# Patient Record
Sex: MALE | Race: WHITE | Employment: OTHER
[De-identification: names, ages, dates, MRNs, and addresses within clinical notes are randomized per-mention and may not be internally consistent; named-entity substitution may affect disease eponyms.]

---

## 2017-01-25 ENCOUNTER — TELEPHONE (OUTPATIENT)
Dept: CASE MANAGEMENT | Age: 82
End: 2017-01-25

## 2018-02-13 PROBLEM — H93.13 TINNITUS OF BOTH EARS: Status: ACTIVE | Noted: 2018-02-13

## 2018-03-12 ENCOUNTER — TELEPHONE (OUTPATIENT)
Dept: PULMONOLOGY | Age: 83
End: 2018-03-12

## 2018-04-16 ENCOUNTER — TELEPHONE (OUTPATIENT)
Dept: PULMONOLOGY | Age: 83
End: 2018-04-16

## 2018-04-30 ENCOUNTER — TELEPHONE (OUTPATIENT)
Dept: CASE MANAGEMENT | Age: 83
End: 2018-04-30

## 2018-05-16 ENCOUNTER — OFFICE VISIT (OUTPATIENT)
Dept: PULMONOLOGY | Age: 83
End: 2018-05-16
Payer: COMMERCIAL

## 2018-05-16 DIAGNOSIS — R91.8 MULTIPLE LUNG NODULES: Primary | ICD-10-CM

## 2018-05-16 PROCEDURE — 99204 OFFICE O/P NEW MOD 45 MIN: CPT | Performed by: INTERNAL MEDICINE

## 2018-05-17 ENCOUNTER — TELEPHONE (OUTPATIENT)
Dept: CASE MANAGEMENT | Age: 83
End: 2018-05-17

## 2018-07-09 ENCOUNTER — TELEPHONE (OUTPATIENT)
Dept: PULMONOLOGY | Age: 83
End: 2018-07-09

## 2018-08-20 ENCOUNTER — TELEPHONE (OUTPATIENT)
Dept: CASE MANAGEMENT | Age: 83
End: 2018-08-20

## 2018-08-20 NOTE — TELEPHONE ENCOUNTER
Patient was a no show for his follow up CT Chest, I attempted to call and reschedule but was unable to contact him. No answer/ no voicemail. He has a follow up at the [place] [address] on 9/19/18 and was supposed to have the scan prior. Mailed letter.       MIKE Hernandez., R.T.(R)(T)  Navigator  [facility]

## 2018-09-17 ENCOUNTER — TELEPHONE (OUTPATIENT)
Dept: CASE MANAGEMENT | Age: 83
End: 2018-09-17

## 2018-10-04 ENCOUNTER — TELEPHONE (OUTPATIENT)
Dept: CASE MANAGEMENT | Age: 83
End: 2018-10-04

## 2018-11-30 ENCOUNTER — TELEPHONE (OUTPATIENT)
Dept: CASE MANAGEMENT | Age: 83
End: 2018-11-30

## 2019-01-02 ENCOUNTER — TELEPHONE (OUTPATIENT)
Dept: CASE MANAGEMENT | Age: 84
End: 2019-01-02

## 2019-04-14 ENCOUNTER — APPOINTMENT (OUTPATIENT)
Dept: ULTRASOUND IMAGING | Age: 84
End: 2019-04-14
Payer: MEDICARE

## 2019-04-14 ENCOUNTER — APPOINTMENT (OUTPATIENT)
Dept: CT IMAGING | Age: 84
End: 2019-04-14
Payer: MEDICARE

## 2019-04-14 ENCOUNTER — HOSPITAL ENCOUNTER (OUTPATIENT)
Age: 84
Setting detail: OBSERVATION
Discharge: HOME | End: 2019-04-15
Attending: EMERGENCY MEDICINE | Admitting: INTERNAL MEDICINE
Payer: MEDICARE

## 2019-04-14 ENCOUNTER — APPOINTMENT (OUTPATIENT)
Dept: GENERAL RADIOLOGY | Age: 84
End: 2019-04-14
Payer: MEDICARE

## 2019-04-14 DIAGNOSIS — M54.2 NECK PAIN: ICD-10-CM

## 2019-04-14 DIAGNOSIS — R55 SYNCOPE AND COLLAPSE: Primary | ICD-10-CM

## 2019-04-14 PROBLEM — I10 HTN (HYPERTENSION): Status: ACTIVE | Noted: 2019-04-14

## 2019-04-14 LAB
ALBUMIN SERPL-MCNC: 3.7 G/DL (ref 3.5–5.2)
ALP BLD-CCNC: 97 U/L (ref 40–129)
ALT SERPL-CCNC: 6 U/L (ref 0–40)
ANION GAP SERPL CALCULATED.3IONS-SCNC: 14 MMOL/L (ref 7–16)
AST SERPL-CCNC: 14 U/L (ref 0–39)
BILIRUB SERPL-MCNC: 0.2 MG/DL (ref 0–1.2)
BILIRUBIN URINE: NEGATIVE
BLOOD, URINE: NEGATIVE
BUN BLDV-MCNC: 11 MG/DL (ref 8–23)
CALCIUM SERPL-MCNC: 8.3 MG/DL (ref 8.6–10.2)
CHLORIDE BLD-SCNC: 97 MMOL/L (ref 98–107)
CLARITY: CLEAR
CO2: 22 MMOL/L (ref 22–29)
COLOR: ABNORMAL
CREAT SERPL-MCNC: 1.1 MG/DL (ref 0.7–1.2)
EKG ATRIAL RATE: 72 BPM
EKG P AXIS: 83 DEGREES
EKG P-R INTERVAL: 194 MS
EKG Q-T INTERVAL: 416 MS
EKG QRS DURATION: 94 MS
EKG QTC CALCULATION (BAZETT): 455 MS
EKG R AXIS: 33 DEGREES
EKG T AXIS: 113 DEGREES
EKG VENTRICULAR RATE: 72 BPM
GFR AFRICAN AMERICAN: >60
GFR NON-AFRICAN AMERICAN: >60 ML/MIN/1.73
GLUCOSE BLD-MCNC: 120 MG/DL (ref 74–99)
GLUCOSE URINE: NEGATIVE MG/DL
HCT VFR BLD CALC: 33.6 % (ref 37–54)
HEMOGLOBIN: 10.8 G/DL (ref 12.5–16.5)
INR BLD: 1.1
KETONES, URINE: ABNORMAL MG/DL
LACTIC ACID: 1.6 MMOL/L (ref 0.5–2.2)
LEUKOCYTE ESTERASE, URINE: NEGATIVE
MCH RBC QN AUTO: 27.8 PG (ref 26–35)
MCHC RBC AUTO-ENTMCNC: 32.1 % (ref 32–34.5)
MCV RBC AUTO: 86.6 FL (ref 80–99.9)
NITRITE, URINE: NEGATIVE
PDW BLD-RTO: 13.9 FL (ref 11.5–15)
PH UA: 6.5 (ref 5–9)
PHENYTOIN DOSE AMOUNT: ABNORMAL
PHENYTOIN LEVEL: 2 UG/ML (ref 10–20)
PLATELET # BLD: 203 E9/L (ref 130–450)
PMV BLD AUTO: 9.9 FL (ref 7–12)
POTASSIUM SERPL-SCNC: 3.9 MMOL/L (ref 3.5–5)
PROTEIN UA: NEGATIVE MG/DL
PROTHROMBIN TIME: 12 SEC (ref 9.3–12.4)
RBC # BLD: 3.88 E12/L (ref 3.8–5.8)
SODIUM BLD-SCNC: 133 MMOL/L (ref 132–146)
SPECIFIC GRAVITY UA: <=1.005 (ref 1–1.03)
TOTAL CK: 127 U/L (ref 20–200)
TOTAL PROTEIN: 6.7 G/DL (ref 6.4–8.3)
TROPONIN: <0.01 NG/ML (ref 0–0.03)
UROBILINOGEN, URINE: 0.2 E.U./DL
WBC # BLD: 11 E9/L (ref 4.5–11.5)

## 2019-04-14 PROCEDURE — 81003 URINALYSIS AUTO W/O SCOPE: CPT

## 2019-04-14 PROCEDURE — 80185 ASSAY OF PHENYTOIN TOTAL: CPT

## 2019-04-14 PROCEDURE — 85027 COMPLETE CBC AUTOMATED: CPT

## 2019-04-14 PROCEDURE — 93880 EXTRACRANIAL BILAT STUDY: CPT

## 2019-04-14 PROCEDURE — 82550 ASSAY OF CK (CPK): CPT

## 2019-04-14 PROCEDURE — 74177 CT ABD & PELVIS W/CONTRAST: CPT

## 2019-04-14 PROCEDURE — 80053 COMPREHEN METABOLIC PANEL: CPT

## 2019-04-14 PROCEDURE — 85610 PROTHROMBIN TIME: CPT

## 2019-04-14 PROCEDURE — 71045 X-RAY EXAM CHEST 1 VIEW: CPT

## 2019-04-14 PROCEDURE — 93005 ELECTROCARDIOGRAM TRACING: CPT | Performed by: EMERGENCY MEDICINE

## 2019-04-14 PROCEDURE — G0378 HOSPITAL OBSERVATION PER HR: HCPCS

## 2019-04-14 PROCEDURE — 96361 HYDRATE IV INFUSION ADD-ON: CPT

## 2019-04-14 PROCEDURE — 99285 EMERGENCY DEPT VISIT HI MDM: CPT

## 2019-04-14 PROCEDURE — 97161 PT EVAL LOW COMPLEX 20 MIN: CPT

## 2019-04-14 PROCEDURE — 84484 ASSAY OF TROPONIN QUANT: CPT

## 2019-04-14 PROCEDURE — 6360000002 HC RX W HCPCS: Performed by: EMERGENCY MEDICINE

## 2019-04-14 PROCEDURE — 72125 CT NECK SPINE W/O DYE: CPT

## 2019-04-14 PROCEDURE — 70450 CT HEAD/BRAIN W/O DYE: CPT

## 2019-04-14 PROCEDURE — 2580000003 HC RX 258: Performed by: INTERNAL MEDICINE

## 2019-04-14 PROCEDURE — 6360000004 HC RX CONTRAST MEDICATION: Performed by: RADIOLOGY

## 2019-04-14 PROCEDURE — 96375 TX/PRO/DX INJ NEW DRUG ADDON: CPT

## 2019-04-14 PROCEDURE — 96376 TX/PRO/DX INJ SAME DRUG ADON: CPT

## 2019-04-14 PROCEDURE — 6360000002 HC RX W HCPCS: Performed by: INTERNAL MEDICINE

## 2019-04-14 PROCEDURE — 96372 THER/PROPH/DIAG INJ SC/IM: CPT

## 2019-04-14 PROCEDURE — 96374 THER/PROPH/DIAG INJ IV PUSH: CPT

## 2019-04-14 PROCEDURE — 83605 ASSAY OF LACTIC ACID: CPT

## 2019-04-14 PROCEDURE — 36415 COLL VENOUS BLD VENIPUNCTURE: CPT

## 2019-04-14 PROCEDURE — 2580000003 HC RX 258: Performed by: EMERGENCY MEDICINE

## 2019-04-14 RX ORDER — SODIUM CHLORIDE 0.9 % (FLUSH) 0.9 %
10 SYRINGE (ML) INJECTION EVERY 12 HOURS SCHEDULED
Status: DISCONTINUED | OUTPATIENT
Start: 2019-04-14 | End: 2019-04-15 | Stop reason: HOSPADM

## 2019-04-14 RX ORDER — 0.9 % SODIUM CHLORIDE 0.9 %
1000 INTRAVENOUS SOLUTION INTRAVENOUS ONCE
Status: COMPLETED | OUTPATIENT
Start: 2019-04-14 | End: 2019-04-14

## 2019-04-14 RX ORDER — ONDANSETRON 2 MG/ML
4 INJECTION INTRAMUSCULAR; INTRAVENOUS ONCE
Status: COMPLETED | OUTPATIENT
Start: 2019-04-14 | End: 2019-04-14

## 2019-04-14 RX ORDER — ACETAMINOPHEN 325 MG/1
650 TABLET ORAL EVERY 4 HOURS PRN
Status: DISCONTINUED | OUTPATIENT
Start: 2019-04-14 | End: 2019-04-15 | Stop reason: HOSPADM

## 2019-04-14 RX ORDER — PROMETHAZINE HYDROCHLORIDE 25 MG/ML
6.25 INJECTION, SOLUTION INTRAMUSCULAR; INTRAVENOUS ONCE
Status: COMPLETED | OUTPATIENT
Start: 2019-04-14 | End: 2019-04-14

## 2019-04-14 RX ORDER — SODIUM CHLORIDE 0.9 % (FLUSH) 0.9 %
10 SYRINGE (ML) INJECTION PRN
Status: DISCONTINUED | OUTPATIENT
Start: 2019-04-14 | End: 2019-04-15 | Stop reason: HOSPADM

## 2019-04-14 RX ORDER — ONDANSETRON 2 MG/ML
4 INJECTION INTRAMUSCULAR; INTRAVENOUS EVERY 6 HOURS PRN
Status: DISCONTINUED | OUTPATIENT
Start: 2019-04-14 | End: 2019-04-15 | Stop reason: HOSPADM

## 2019-04-14 RX ORDER — SODIUM CHLORIDE 9 MG/ML
INJECTION, SOLUTION INTRAVENOUS CONTINUOUS
Status: DISCONTINUED | OUTPATIENT
Start: 2019-04-14 | End: 2019-04-14

## 2019-04-14 RX ADMIN — ENOXAPARIN SODIUM 40 MG: 40 INJECTION SUBCUTANEOUS at 10:25

## 2019-04-14 RX ADMIN — IOPAMIDOL 110 ML: 755 INJECTION, SOLUTION INTRAVENOUS at 02:31

## 2019-04-14 RX ADMIN — SODIUM CHLORIDE: 9 INJECTION, SOLUTION INTRAVENOUS at 08:53

## 2019-04-14 RX ADMIN — SODIUM CHLORIDE: 9 INJECTION, SOLUTION INTRAVENOUS at 16:42

## 2019-04-14 RX ADMIN — ONDANSETRON 4 MG: 2 INJECTION INTRAMUSCULAR; INTRAVENOUS at 03:02

## 2019-04-14 RX ADMIN — SODIUM CHLORIDE 1000 ML: 9 INJECTION, SOLUTION INTRAVENOUS at 01:16

## 2019-04-14 RX ADMIN — PROMETHAZINE HYDROCHLORIDE 6.25 MG: 25 INJECTION INTRAMUSCULAR; INTRAVENOUS at 04:20

## 2019-04-14 RX ADMIN — Medication 10 ML: at 08:53

## 2019-04-14 RX ADMIN — ONDANSETRON 4 MG: 2 INJECTION INTRAMUSCULAR; INTRAVENOUS at 00:57

## 2019-04-14 ASSESSMENT — PAIN SCALES - GENERAL
PAINLEVEL_OUTOF10: 0
PAINLEVEL_OUTOF10: 6
PAINLEVEL_OUTOF10: 0
PAINLEVEL_OUTOF10: 0

## 2019-04-14 ASSESSMENT — PAIN DESCRIPTION - LOCATION: LOCATION: NECK

## 2019-04-14 ASSESSMENT — PAIN DESCRIPTION - PAIN TYPE: TYPE: ACUTE PAIN

## 2019-04-14 NOTE — PROGRESS NOTES
Physical Therapy    Initial Assessment     Name: Karlee Cordero  : 1934  MRN: [id number]    Date of Service: 2019    Evaluating PT:  Elmo Marques, PT, DPT [id number]    Room #:  3244/3732-W  Diagnosis:  Syncope and collapse   PMHx:  CAD, chest pain, chronic back pain, HLD, HTN, MI, seizure disorder, iliac artery stent, syncope, tinnitus of both ears   Precautions:  Falls, Mild [place]  Equipment Needs:  TBD    Pt lives with son in a 1 floor apt with 1 stairs to enter and 0 rail(s). Bedroom and bathroom are on the main level. Pt ambulated with no AD vs SPC PTA. Pt reports independence with ADL's and drives. States he recently starting using a SPC as needed. Initial Evaluation  Date: 19  Treatment Short Term/ Long Term   Goals   AM-PAC 6 Clicks      Was pt agreeable to Eval/treatment? Yes     Does pt have pain? Pt reports mild neck pain (chronic)      Bed Mobility  Rolling: Independent  Supine to sit: Independent   Sit to supine: NT  Scooting: Independent   Independent    Transfers Sit to stand: SBA  Stand to sit: SBA  Stand pivot: Min A with no AD  Sit to stand: Independent   Stand to sit:  Independent   Stand pivot: Modified Independent     Ambulation    150 feet with no AD min A  >200 feet with AAD Modified Independent     Stair negotiation: ascended and descended  NT  >4 steps with 1 rail Modified Independent     ROM BUE:  WFL  BLE:  WFL     Strength BUE:  NT  BLE:  Grossly 4-/5      Balance Sitting EOB:  Independent   Dynamic Standing:  Min A with No AD  Sitting EOB:  Independent   Dynamic Standing:  Modified Independent       Pt is A & O x 4  Sensation:  Pt reports numbness and tingling to BLE  Edema:  Unremarkable     Patient education  Pt educated on PT role, safety during functional mobility, gait training, use of assistive device to improve balance     Patient response to education:   Pt verbalized understanding Pt demonstrated skill Pt requires further education in this area   Yes Yes  Reinforce      Comments:  Pt received supine and agreeable to PT evaluation. Pt states he has been feeling weak. No assist during bed mobility. Pt states he recently starting using SPC as needed. Refused use of [employer], stating he does not need it. Pt does ambulate with unsteadiness and requires hands on assist for balance. Pt also demonstrates BLE weakness. Pt reaches for furniture and walls during ambulation. Requires cues for safety. Instructed pt on use of assistive device in order to improve balance. States his gait does not feel at his baseline. Returned to room and pt sat EOB. Pt left with call button in reach and needs met. Pts/ family goals   1. Home     Patient and or family understand(s) diagnosis, prognosis, and plan of care. Yes     PLAN  PT care will be provided in accordance with the objectives noted above. Whenever appropriate, clear delegation orders will be provided for nursing staff. Exercises and functional mobility practice will be used as well as appropriate assistive devices or modalities to obtain goals. Patient and family education will also be administered as needed. Frequency of treatments: 2-5x/week x 7-10 days.     Time in  0825  Time out  632 Erlin Obando PT, DPT  [id number]

## 2019-04-14 NOTE — ED PROVIDER NOTES
drugs. Family History: family history is not on file. The patients home medications have been reviewed. Allergies: Patient has no known allergies. ---------------------------------------------------PHYSICAL EXAM--------------------------------------    Constitutional/General: Alert and oriented x3,Mild distress  Head: Normocephalic and atraumatic  Eyes: PERRL, EOMI, conjunctiva normal, sclera non icteric  Mouth: Oropharynx clear  Neck:Tenderness to posterior neck  Respiratory: Lungs clear to auscultation bilaterally, no wheezes, rales, or rhonchi. Not in respiratory distress  Cardiovascular:  Regular rate. Regular rhythm. No murmurs, gallops, or rubs. 2+ distal pulses  Chest: No chest wall tenderness  GI:  Tenderness to the upper abdomen. Abdomen Soft, Non distended. +BS. No rebound, guarding, or rigidity. No pulsatile masses. Musculoskeletal: Moves all extremities x 4. Warm and well perfused, no clubbing, cyanosis, or edema. Integument: Skin warm and dry. No rashes. Neurologic: GCS 15, no focal deficits, symmetric strength 5/5 in the upper and moves lower extremities but weaker  Psychiatric: Normal Affect    -------------------------------------------------- RESULTS -------------------------------------------------  I have personally reviewed all laboratory and imaging results for this patient. Results are listed below.      LABS:  Results for orders placed or performed during the hospital encounter of 04/14/19   Phenytoin Level, Total   Result Value Ref Range    Phenytoin Lvl 2.0 (L) 10.0 - 20.0 ug/mL    Phenytoin Dose Amount Unknown    CBC   Result Value Ref Range    WBC 11.0 4.5 - 11.5 E9/L    RBC 3.88 3.80 - 5.80 E12/L    Hemoglobin 10.8 (L) 12.5 - 16.5 g/dL    Hematocrit 33.6 (L) 37.0 - 54.0 %    MCV 86.6 80.0 - 99.9 fL    MCH 27.8 26.0 - 35.0 pg    MCHC 32.1 32.0 - 34.5 %    RDW 13.9 11.5 - 15.0 fL    Platelets 047 249 - 398 E9/L    MPV 9.9 7.0 - 12.0 fL   Comprehensive Metabolic Panel Result Value Ref Range    Sodium 133 132 - 146 mmol/L    Potassium 3.9 3.5 - 5.0 mmol/L    Chloride 97 (L) 98 - 107 mmol/L    CO2 22 22 - 29 mmol/L    Anion Gap 14 7 - 16 mmol/L    Glucose 120 (H) 74 - 99 mg/dL    BUN 11 8 - 23 mg/dL    CREATININE 1.1 0.7 - 1.2 mg/dL    GFR Non-African American >60 >=60 mL/min/1.73    GFR African American >60     Calcium 8.3 (L) 8.6 - 10.2 mg/dL    Total Protein 6.7 6.4 - 8.3 g/dL    Alb 3.7 3.5 - 5.2 g/dL    Total Bilirubin 0.2 0.0 - 1.2 mg/dL    Alkaline Phosphatase 97 40 - 129 U/L    ALT 6 0 - 40 U/L    AST 14 0 - 39 U/L   Troponin   Result Value Ref Range    Troponin <0.01 0.00 - 0.03 ng/mL   Lactic Acid, Plasma   Result Value Ref Range    Lactic Acid 1.6 0.5 - 2.2 mmol/L   CK   Result Value Ref Range    Total  20 - 200 U/L   Urinalysis   Result Value Ref Range    Color, UA Straw Straw/Yellow    Clarity, UA Clear Clear    Glucose, Ur Negative Negative mg/dL    Bilirubin Urine Negative Negative    Ketones, Urine TRACE (A) Negative mg/dL    Specific Gravity, UA <=1.005 1.005 - 1.030    Blood, Urine Negative Negative    pH, UA 6.5 5.0 - 9.0    Protein, UA Negative Negative mg/dL    Urobilinogen, Urine 0.2 <2.0 E.U./dL    Nitrite, Urine Negative Negative    Leukocyte Esterase, Urine Negative Negative   Protime-INR   Result Value Ref Range    Protime 12.0 9.3 - 12.4 sec    INR 1.1        RADIOLOGY:  Interpreted by Radiologist.  CT Head WO Contrast   Final Result   1. Cerebral and cerebellar atrophy. 2.  Small vessel ischemic/degenerative changes. 3.  Edema in the soft tissues of the left face and left scalp. There may be    some edema over the right zygoma as well. This report has been electronically signed by Saurabh Lynn MD.      CT Cervical Spine WO Contrast   Final Result   1. Anterior cervical fusion from C3-C7. 2.  Degenerative changes as described.       This report has been electronically signed by Saurabh Lynn MD.      [facility] Additional [facility]? None   Final Result   1. Mucosal thickening in the distal esophagus which could be due to reflux    disease but is nonspecific. 2.  Prostate hypertrophy. 3.  Gas-distended colon to the level of the rectum without evidence of    obstruction. 4.  Right hip effusion. If there are any signs of right hip infection,    recommend aspiration of this fluid for laboratory evaluation      This report has been electronically signed by Saurabh Lynn MD.      XR CHEST PORTABLE    (Results Pending)       EKG: This EKG is signed and interpreted by the EP. Time: 1:21 AM  Rate: 72  Rhythm: Sinus  Interpretation: non-specific EKG  Comparison: stable as compared to patient's most recent EKG    ------------------------- NURSING NOTES AND VITALS REVIEWED ---------------------------   The nursing notes within the ED encounter and vital signs as below have been reviewed by myself. /80   Pulse 76   Temp 96.4 °F (35.8 °C) (Temporal)   Resp 17   Ht 5' 9\" (1.753 m)   Wt 170 lb (77.1 kg)   SpO2 100%   BMI 25.10 kg/m²   Oxygen Saturation Interpretation: Normal    The patients available past medical records and past encounters were reviewed. ------------------------------ ED COURSE/MEDICAL DECISION MAKING----------------------  Medications   ondansetron (ZOFRAN) injection 4 mg (4 mg Intravenous Given 4/14/19 0057)   0.9 % sodium chloride bolus (0 mLs Intravenous Stopped 4/14/19 0413)   iopamidol (ISOVUE-370) 76 % injection 110 mL (110 mLs Intravenous Given 4/14/19 0231)   ondansetron (ZOFRAN) injection 4 mg (4 mg Intravenous Given 4/14/19 0302)   promethazine (PHENERGAN) injection 6.25 mg (6.25 mg Intravenous Given 4/14/19 0420)       Medical Decision Making:         This patient's ED course included: a personal history and physicial examination, re-evaluation prior to disposition and multiple bedside re-evaluations    This patient has remained hemodynamically stable and been closely monitored during their ED course. Re-Evaluations:           Re-evaluation. Patients symptoms show no change   patient reporting no chest pain and  Moving all extremities patient made  Aware of findings  Consultations:             IM    Counseling: The emergency provider has spoken with the patient and discussed todays results, in addition to providing specific details for the plan of care and counseling regarding the diagnosis and prognosis. Questions are answered at this time and they are agreeable with the plan.     --------------------------------- IMPRESSION AND DISPOSITION ---------------------------------    IMPRESSION  1. Syncope and collapse    2. Neck pain        DISPOSITION  Disposition: Admit to telemetry  Patient condition is stable    4/14/19, 12:18 AM.    This note is prepared by Heike Mark, acting as Scribe for Itz Pro MD.    Itz Pro MD:  The scribe's documentation has been prepared under my direction and personally reviewed by me in its entirety. I confirm that the note above accurately reflects all work, treatment, procedures, and medical decision making performed by me.         Itz Pro MD  04/14/19 Pérez Warren MD  04/14/19 3972

## 2019-04-14 NOTE — H&P
Hospital Medicine History & Physical      PCP: No primary care provider on file. Date of Admission: 4/14/2019    Date of Service: Pt seen/examined on 4/14/19. Placed in Observation. Chief Complaint:  syncope      History Of Present Illness:  Record review  80 y.o. male who presented to [address] [place] [address] with syncope. History obtained from the patient. He states he was going to bed last night when he passed out. He states he has had increased dizziness over the past few days and yesterday was at its worst. He states he had just taken his nighttime medications when he was walking to bed, began to feel dizzy and then passed out. He believes he was on the floor for 90 minutes before his son came home. He has not had any recent hospitalizations. He did follow with , but the last entry was August 2018. He did follow up with Dr. Tisha Miller in May 2018 for pulmonary nodules identified in a previous CT. He has seen Dr. Navneet Alaniz in the past.  He has a PMH of CAD, CABG, seizures, HTN, HLD. Upon exam, he is alert and oriented to person, place and time. Breath sounds are clear throughout, a murmur is heard. 1+ BLE is identifed    Past Medical History:          Diagnosis Date    CAD (coronary artery disease)     Chest pain 4/2002    [facility]ospital[facility] at [facility].  Chronic back pain     Hyperlipidemia     Hypertension 1987    Industrial accident 1977    (Back, neck and head).  Lump     Removed from elbow.  MI (myocardial infarction) ([address]     Anterior apical.    MI (myocardial infarction) ([address] 2/10/1999    Acute inferior wall MI.    Seizure disorder ([address]     Status post insertion of iliac artery stent 11/1/82005    Syncope 9/1998    Tinnitus of both ears 2/13/2018       Past Surgical History:          Procedure Laterality Date    CORONARY ARTERY BYPASS GRAFT  5/3/1991    [address]:  AC - SVT to RCA, LIMA to LAD.  DIAGNOSTIC CARDIAC CATH LAB PROCEDURE  4/9/1991    [address].  CAD.     DIAGNOSTIC CARDIAC CATH LAB PROCEDURE  2/10/1999    [address]  DIAGNOSTIC CARDIAC CATH LAB PROCEDURE  11/18/2005    [facility]: Dr. Verónica Tellez CATH LAB PROCEDURE  2/7/2006    With abdominal aortogram.    DIAGNOSTIC CARDIAC CATH LAB PROCEDURE  2/11/2009    [facility].  THROMBECTOMY  11/18/2005    Exploration/thrombectomy of right common femoral and right popliteal arteries. Medications Prior to Admission:      Prior to Admission medications    Medication Sig Start Date End Date Taking? Authorizing Provider   metoprolol tartrate (LOPRESSOR) 25 MG tablet take 1 tablet by mouth twice a day 8/24/18   Nancy Chan MD   phenytoin (DILANTIN) 100 MG ER capsule take 2 capsules by mouth every morning 2 capsules every evening and 8/1/18   Nancy Chan MD   neomycin-polymyxin-hydrocortisone (CORTISPORIN) 3.5-28256-2 otic solution 3 drops in each ear 3 times 3 times daily 12/19/17   Nancy Chan MD   donepezil (ARICEPT) 5 MG tablet Take 1 tablet by mouth nightly 12/19/17   Nancy Chan MD   cephALEXin (KEFLEX) 250 MG capsule Take 1 capsule by mouth 3 times daily 11/7/17   Nancy Chan MD   doxazosin (CARDURA) 2 MG tablet take 1 tablet by mouth at bedtime 8/25/17   Nancy Chan MD   lisinopril (PRINIVIL;ZESTRIL) 5 MG tablet Take 0.5 tablets by mouth daily 12/4/15   Lilli Locke MD   aspirin 81 MG EC tablet Take 81 mg by mouth every other day     Historical Provider, MD       Allergies:  Patient has no known allergies. Social History:      The patient currently lives with son    TOBACCO:   reports that he has never smoked. He has never used smokeless tobacco.  ETOH:   reports that he does not drink alcohol. Family History:       Positive as follows:        Problem Relation Age of Onset    Heart Disease Mother        REVIEW OF SYSTEMS:   Pertinent positives as noted in the HPI. All other systems reviewed and negative.     PHYSICAL EXAM:    /69   Pulse 78   Temp 98.1 °F (36.7 °C) (Temporal) Resp 16   Ht 5' 9\" (1.753 m)   Wt 170 lb (77.1 kg)   SpO2 94%   BMI 25.10 kg/m²     General appearance:  No apparent distress, appears stated age and cooperative. HEENT:  Normal cephalic, atraumatic without obvious deformity. Pupils equal, round, and reactive to light. Extra ocular muscles intact. Conjunctivae/corneas clear. Neck: Supple, with full range of motion. No jugular venous distention. Trachea midline. Respiratory:  Normal respiratory effort. Clear to auscultation, bilaterally without Rales/Wheezes/Rhonchi. Cardiovascular:  Regular rate and rhythm with normal S1/S2 without murmurs, rubs or gallops. Abdomen: Soft, non-tender, non-distended with normal bowel sounds. Musculoskeletal:  No clubbing, cyanosis. 1+ edema bilaterally. Full range of motion without deformity. Skin: Skin color, texture, turgor normal.  No rashes or lesions. Neurologic:  Neurovascularly intact without any focal sensory/motor deficits. Psychiatric:  Alert and oriented, thought content appropriate, normal insight  Capillary Refill: Brisk,< 3 seconds   Peripheral Pulses: +2 palpable, equal bilaterally       CXR:  I have reviewed the CXR with the following interpretation: questionable  Bilateral infiltrates.   EKG:  I have reviewed the EKG with the following interpretation: NSR with PACs    Labs:     Recent Labs     04/14/19 0026   WBC 11.0   HGB 10.8*   HCT 33.6*        Recent Labs     04/14/19 0026      K 3.9   CL 97*   CO2 22   BUN 11   CREATININE 1.1   CALCIUM 8.3*     Recent Labs     04/14/19 0026   AST 14   ALT 6   BILITOT 0.2   ALKPHOS 97     Recent Labs     04/14/19  0026   INR 1.1     Recent Labs     04/14/19 0026   CKTOTAL 127   TROPONINI <0.01       Urinalysis:      Lab Results   Component Value Date    NITRU Negative 04/14/2019    BLOODU Negative 04/14/2019    SPECGRAV <=1.005 04/14/2019    GLUCOSEU Negative 04/14/2019     CT abdomen/pelvis  1.  Mucosal thickening in the distal esophagus which

## 2019-04-15 VITALS
BODY MASS INDEX: 25.18 KG/M2 | HEIGHT: 69 IN | RESPIRATION RATE: 16 BRPM | OXYGEN SATURATION: 98 % | HEART RATE: 87 BPM | WEIGHT: 170 LBS | TEMPERATURE: 97.9 F | SYSTOLIC BLOOD PRESSURE: 156 MMHG | DIASTOLIC BLOOD PRESSURE: 69 MMHG

## 2019-04-15 LAB
ANION GAP SERPL CALCULATED.3IONS-SCNC: 5 MMOL/L (ref 7–16)
BASOPHILS ABSOLUTE: 0.05 E9/L (ref 0–0.2)
BASOPHILS RELATIVE PERCENT: 1 % (ref 0–2)
BUN BLDV-MCNC: 9 MG/DL (ref 8–23)
CALCIUM SERPL-MCNC: 8.2 MG/DL (ref 8.6–10.2)
CHLORIDE BLD-SCNC: 106 MMOL/L (ref 98–107)
CO2: 29 MMOL/L (ref 22–29)
CREAT SERPL-MCNC: 1.1 MG/DL (ref 0.7–1.2)
EOSINOPHILS ABSOLUTE: 0.22 E9/L (ref 0.05–0.5)
EOSINOPHILS RELATIVE PERCENT: 4.2 % (ref 0–6)
GFR AFRICAN AMERICAN: >60
GFR NON-AFRICAN AMERICAN: >60 ML/MIN/1.73
GLUCOSE BLD-MCNC: 84 MG/DL (ref 74–99)
HCT VFR BLD CALC: 31.3 % (ref 37–54)
HEMOGLOBIN: 10 G/DL (ref 12.5–16.5)
IMMATURE GRANULOCYTES #: 0.02 E9/L
IMMATURE GRANULOCYTES %: 0.4 % (ref 0–5)
LV EF: 65 %
LVEF MODALITY: NORMAL
LYMPHOCYTES ABSOLUTE: 1.73 E9/L (ref 1.5–4)
LYMPHOCYTES RELATIVE PERCENT: 33.2 % (ref 20–42)
MCH RBC QN AUTO: 27.9 PG (ref 26–35)
MCHC RBC AUTO-ENTMCNC: 31.9 % (ref 32–34.5)
MCV RBC AUTO: 87.4 FL (ref 80–99.9)
MONOCYTES ABSOLUTE: 0.43 E9/L (ref 0.1–0.95)
MONOCYTES RELATIVE PERCENT: 8.3 % (ref 2–12)
NEUTROPHILS ABSOLUTE: 2.76 E9/L (ref 1.8–7.3)
NEUTROPHILS RELATIVE PERCENT: 52.9 % (ref 43–80)
PDW BLD-RTO: 14 FL (ref 11.5–15)
PLATELET # BLD: 191 E9/L (ref 130–450)
PMV BLD AUTO: 10 FL (ref 7–12)
POTASSIUM REFLEX MAGNESIUM: 4.3 MMOL/L (ref 3.5–5)
RBC # BLD: 3.58 E12/L (ref 3.8–5.8)
SODIUM BLD-SCNC: 140 MMOL/L (ref 132–146)
WBC # BLD: 5.2 E9/L (ref 4.5–11.5)

## 2019-04-15 PROCEDURE — 93306 TTE W/DOPPLER COMPLETE: CPT

## 2019-04-15 PROCEDURE — 80048 BASIC METABOLIC PNL TOTAL CA: CPT

## 2019-04-15 PROCEDURE — 85025 COMPLETE CBC W/AUTO DIFF WBC: CPT

## 2019-04-15 PROCEDURE — 6360000002 HC RX W HCPCS: Performed by: INTERNAL MEDICINE

## 2019-04-15 PROCEDURE — 96372 THER/PROPH/DIAG INJ SC/IM: CPT

## 2019-04-15 PROCEDURE — 36415 COLL VENOUS BLD VENIPUNCTURE: CPT

## 2019-04-15 PROCEDURE — 2580000003 HC RX 258: Performed by: INTERNAL MEDICINE

## 2019-04-15 PROCEDURE — G0378 HOSPITAL OBSERVATION PER HR: HCPCS

## 2019-04-15 RX ADMIN — Medication 10 ML: at 09:48

## 2019-04-15 RX ADMIN — ENOXAPARIN SODIUM 40 MG: 40 INJECTION SUBCUTANEOUS at 09:49

## 2019-04-15 ASSESSMENT — PAIN SCALES - GENERAL
PAINLEVEL_OUTOF10: 0
PAINLEVEL_OUTOF10: 0

## 2019-04-15 NOTE — CARE COORDINATION
ADVANCED CARE PLANNING    Patient Name: Michell Erwin       YOB: 1934              MRN:    [id number]  Admission Date:  4/14/2019 12:16 AM    Active Diagnoses:  Principal Problem:    Syncope and collapse  Active Problems:    CAD (coronary artery disease)    Late onset Alzheimer's disease without behavioral disturbance    Seizure disorder (HCC)    HTN (hypertension)  Resolved Problems:    * No resolved hospital problems. *      These active diagnoses are of sufficient risk that focused discussion on advanced care planning is indicated in order to allow the patient to thoughtfully consider personal goals of care; and, if situations arise that prevent the patient to personally give input, to ensure appropriate representation of their personal desire for different levels and levels of care. Persons present in discussion: Dub Gaucher, DO, Family members: Son. Discussion: I reviewed his admission for syncope and his desires for ongoing aggressive care (wishes to remain full code and have all aggressive care), including potential intubation and mechanical ventilation (argrees); also discussed who would speak on his behalf should he be unable to do so (his son Eliceo Poon [[phone]]) and discussed what conversations he has had with his family so they understand his desires if such a situation occurred now or in the future. I presented and explained the availability of our palliative care team to him, including the availability of advanced directives forms which he can review with his family and then fill out if they so wish. Time Spent on Advanced Planning Documents: 20 minutes    Electronically signed by Ernesto Hooper DO on 4/14/2019 at 8:46 PM    NOTE: This report was transcribed using voice recognition software. Every effort was made to ensure accuracy; however, inadvertent computerized transcription errors may be present.

## 2019-04-15 NOTE — CARE COORDINATION
CM met with patient in his room. Patient currently lives with his son, Sidney Pineda in a one story home with 1 step to enter. Patients Patient states he plans to discharge home with no needs. He stated we can call his son, Karla Humphries, when he is ready to discharge. ADELA made a hospital follow up appointment with [facility] d/t patient no longer is under the services of Dr. Bhaskar Siu. Appointment is for: April 23, 2019 at 10:00 pm. ADELA informed patient of this appointment.   Javy Oneill RN

## 2019-04-15 NOTE — PLAN OF CARE
Problem: Falls - Risk of:  Goal: Will remain free from falls  Description  Will remain free from falls  4/15/2019 0136 by Vane Alejandra RN  Outcome: Met This Shift     Problem: Safety:  Goal: Free from accidental physical injury  Description  Free from accidental physical injury  Outcome: Met This Shift     Problem: Daily Care:  Goal: Daily care needs are met  Description  Daily care needs are met  Outcome: Met This Shift

## 2019-04-15 NOTE — PROGRESS NOTES
Neurovascularly intact without any focal sensory/motor deficits. Psychiatric:  Alert and oriented, thought content appropriate, normal insight  Capillary Refill: Brisk,< 3 seconds   Peripheral Pulses: +2 palpable, equal bilaterally            Labs:   Recent Labs     04/14/19  0026 04/15/19  0401   WBC 11.0 5.2   HGB 10.8* 10.0*   HCT 33.6* 31.3*    191     Recent Labs     04/14/19 0026 04/15/19  0401    140   K 3.9 4.3   CL 97* 106   CO2 22 29   BUN 11 9   CREATININE 1.1 1.1   CALCIUM 8.3* 8.2*     Recent Labs     04/14/19  0026   AST 14   ALT 6   BILITOT 0.2   ALKPHOS 97     Recent Labs     04/14/19 0026   INR 1.1     Recent Labs     04/14/19 0026   CKTOTAL 127   TROPONINI <0.01       Imaging:  US CAROTID ARTERY BILATERAL   Final Result   Atherosclerotic disease. No hemodynamically significant stenosis is   identified   Estimated stenosis by NASCET criteria in the proximal right carotid   artery is between 0% and 49%. Estimated stenosis by NASCET criteria in the proximal left carotid   artery is between 0% and 49%. Antegrade vertebral flow is confirmed bilaterally. The cardiac rhythm was regular during this exam.         XR CHEST PORTABLE   Final Result   Tortuous ectatic aorta   Cardiomegaly   Airspace disease compatible with pneumonia   at the right and the left lung base   The chest appears to be worse in the interval               CT Head WO Contrast   Final Result   1. Cerebral and cerebellar atrophy. 2.  Small vessel ischemic/degenerative changes. 3.  Edema in the soft tissues of the left face and left scalp. There may be    some edema over the right zygoma as well. This report has been electronically signed by Kelly Ng MD.      CT Cervical Spine WO Contrast   Final Result   1. Anterior cervical fusion from C3-C7. 2.  Degenerative changes as described.       This report has been electronically signed by Kelly Ng MD.      [address] [facility]
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
## 2019-05-15 ENCOUNTER — HOSPITAL ENCOUNTER (OUTPATIENT)
Age: 84
Discharge: HOME | End: 2019-05-17
Payer: MEDICARE

## 2019-05-15 ENCOUNTER — OFFICE VISIT (OUTPATIENT)
Dept: FAMILY MEDICINE CLINIC | Age: 84
End: 2019-05-15
Payer: MEDICARE

## 2019-05-15 VITALS
HEART RATE: 60 BPM | TEMPERATURE: 97.9 F | BODY MASS INDEX: 23.7 KG/M2 | OXYGEN SATURATION: 96 % | WEIGHT: 175 LBS | RESPIRATION RATE: 16 BRPM | SYSTOLIC BLOOD PRESSURE: 110 MMHG | HEIGHT: 72 IN | DIASTOLIC BLOOD PRESSURE: 72 MMHG

## 2019-05-15 DIAGNOSIS — I10 ESSENTIAL HYPERTENSION: ICD-10-CM

## 2019-05-15 DIAGNOSIS — Z12.5 PROSTATE CANCER SCREENING: ICD-10-CM

## 2019-05-15 DIAGNOSIS — R39.11 URINARY HESITANCY: ICD-10-CM

## 2019-05-15 DIAGNOSIS — I25.10 CORONARY ARTERY DISEASE INVOLVING NATIVE CORONARY ARTERY OF NATIVE HEART WITHOUT ANGINA PECTORIS: ICD-10-CM

## 2019-05-15 DIAGNOSIS — Z00.00 HEALTHCARE MAINTENANCE: Primary | ICD-10-CM

## 2019-05-15 DIAGNOSIS — Z00.00 HEALTHCARE MAINTENANCE: ICD-10-CM

## 2019-05-15 LAB
ALBUMIN SERPL-MCNC: 4.2 G/DL (ref 3.5–5.2)
ALP BLD-CCNC: 93 U/L (ref 40–129)
ALT SERPL-CCNC: 8 U/L (ref 0–40)
ANION GAP SERPL CALCULATED.3IONS-SCNC: 14 MMOL/L (ref 7–16)
AST SERPL-CCNC: 14 U/L (ref 0–39)
BASOPHILS ABSOLUTE: 0.06 E9/L (ref 0–0.2)
BASOPHILS RELATIVE PERCENT: 0.7 % (ref 0–2)
BILIRUB SERPL-MCNC: <0.2 MG/DL (ref 0–1.2)
BUN BLDV-MCNC: 19 MG/DL (ref 8–23)
CALCIUM SERPL-MCNC: 9.3 MG/DL (ref 8.6–10.2)
CHLORIDE BLD-SCNC: 104 MMOL/L (ref 98–107)
CHOLESTEROL, TOTAL: 163 MG/DL (ref 0–199)
CO2: 24 MMOL/L (ref 22–29)
CREAT SERPL-MCNC: 0.9 MG/DL (ref 0.7–1.2)
EOSINOPHILS ABSOLUTE: 0.2 E9/L (ref 0.05–0.5)
EOSINOPHILS RELATIVE PERCENT: 2.3 % (ref 0–6)
GFR AFRICAN AMERICAN: >60
GFR NON-AFRICAN AMERICAN: >60 ML/MIN/1.73
GLUCOSE BLD-MCNC: 90 MG/DL (ref 74–99)
HCT VFR BLD CALC: 39.6 % (ref 37–54)
HDLC SERPL-MCNC: 59 MG/DL
HEMOGLOBIN: 12.1 G/DL (ref 12.5–16.5)
IMMATURE GRANULOCYTES #: 0.03 E9/L
IMMATURE GRANULOCYTES %: 0.4 % (ref 0–5)
LDL CHOLESTEROL CALCULATED: 88 MG/DL (ref 0–99)
LYMPHOCYTES ABSOLUTE: 1.8 E9/L (ref 1.5–4)
LYMPHOCYTES RELATIVE PERCENT: 21 % (ref 20–42)
MCH RBC QN AUTO: 27.3 PG (ref 26–35)
MCHC RBC AUTO-ENTMCNC: 30.6 % (ref 32–34.5)
MCV RBC AUTO: 89.4 FL (ref 80–99.9)
MONOCYTES ABSOLUTE: 0.51 E9/L (ref 0.1–0.95)
MONOCYTES RELATIVE PERCENT: 6 % (ref 2–12)
NEUTROPHILS ABSOLUTE: 5.97 E9/L (ref 1.8–7.3)
NEUTROPHILS RELATIVE PERCENT: 69.6 % (ref 43–80)
PDW BLD-RTO: 14.6 FL (ref 11.5–15)
PLATELET # BLD: 253 E9/L (ref 130–450)
PMV BLD AUTO: 10.8 FL (ref 7–12)
POTASSIUM SERPL-SCNC: 4.9 MMOL/L (ref 3.5–5)
PROSTATE SPECIFIC ANTIGEN: 24.29 NG/ML (ref 0–4)
RBC # BLD: 4.43 E12/L (ref 3.8–5.8)
SODIUM BLD-SCNC: 142 MMOL/L (ref 132–146)
TOTAL PROTEIN: 7.4 G/DL (ref 6.4–8.3)
TRIGL SERPL-MCNC: 82 MG/DL (ref 0–149)
TSH SERPL DL<=0.05 MIU/L-ACNC: 2.4 UIU/ML (ref 0.27–4.2)
VLDLC SERPL CALC-MCNC: 16 MG/DL
WBC # BLD: 8.6 E9/L (ref 4.5–11.5)

## 2019-05-15 PROCEDURE — 80061 LIPID PANEL: CPT

## 2019-05-15 PROCEDURE — G8598 ASA/ANTIPLAT THER USED: HCPCS | Performed by: NURSE PRACTITIONER

## 2019-05-15 PROCEDURE — 1036F TOBACCO NON-USER: CPT | Performed by: NURSE PRACTITIONER

## 2019-05-15 PROCEDURE — 84443 ASSAY THYROID STIM HORMONE: CPT

## 2019-05-15 PROCEDURE — G8427 DOCREV CUR MEDS BY ELIG CLIN: HCPCS | Performed by: NURSE PRACTITIONER

## 2019-05-15 PROCEDURE — 1123F ACP DISCUSS/DSCN MKR DOCD: CPT | Performed by: NURSE PRACTITIONER

## 2019-05-15 PROCEDURE — G8420 CALC BMI NORM PARAMETERS: HCPCS | Performed by: NURSE PRACTITIONER

## 2019-05-15 PROCEDURE — 80053 COMPREHEN METABOLIC PANEL: CPT

## 2019-05-15 PROCEDURE — 85025 COMPLETE CBC W/AUTO DIFF WBC: CPT

## 2019-05-15 PROCEDURE — G0103 PSA SCREENING: HCPCS

## 2019-05-15 PROCEDURE — 99214 OFFICE O/P EST MOD 30 MIN: CPT | Performed by: NURSE PRACTITIONER

## 2019-05-15 PROCEDURE — 4040F PNEUMOC VAC/ADMIN/RCVD: CPT | Performed by: NURSE PRACTITIONER

## 2019-05-15 RX ORDER — PHENYTOIN SODIUM 100 MG/1
CAPSULE, EXTENDED RELEASE ORAL
Qty: 120 CAPSULE | Refills: 2 | Status: CANCELLED | OUTPATIENT
Start: 2019-05-15

## 2019-05-15 RX ORDER — LISINOPRIL 5 MG/1
5 TABLET ORAL DAILY
Qty: 90 TABLET | Refills: 1 | Status: SHIPPED | OUTPATIENT
Start: 2019-05-15 | End: 2019-06-03 | Stop reason: ALTCHOICE

## 2019-05-15 RX ORDER — LISINOPRIL 5 MG/1
2.5 TABLET ORAL DAILY
Qty: 30 TABLET | Refills: 3 | Status: CANCELLED | OUTPATIENT
Start: 2019-05-15

## 2019-05-15 RX ORDER — ASPIRIN 81 MG/1
81 TABLET ORAL EVERY OTHER DAY
Qty: 90 TABLET | Refills: 1 | Status: SHIPPED
Start: 2019-05-15 | End: 2022-09-20

## 2019-05-15 ASSESSMENT — PATIENT HEALTH QUESTIONNAIRE - PHQ9
1. LITTLE INTEREST OR PLEASURE IN DOING THINGS: 0
SUM OF ALL RESPONSES TO PHQ QUESTIONS 1-9: 0
SUM OF ALL RESPONSES TO PHQ9 QUESTIONS 1 & 2: 0
SUM OF ALL RESPONSES TO PHQ9 QUESTIONS 1 & 2: 0
SUM OF ALL RESPONSES TO PHQ QUESTIONS 1-9: 0
2. FEELING DOWN, DEPRESSED OR HOPELESS: 0
SUM OF ALL RESPONSES TO PHQ QUESTIONS 1-9: 0
1. LITTLE INTEREST OR PLEASURE IN DOING THINGS: 0
2. FEELING DOWN, DEPRESSED OR HOPELESS: 0
SUM OF ALL RESPONSES TO PHQ QUESTIONS 1-9: 0

## 2019-05-15 NOTE — PROGRESS NOTES
Angela Farmer is a 80 y.o. male who presents today for   Chief Complaint   Patient presents with    [facility] Care         HPI    Pt presents new to [facility], previous PCP was Dr. Modesto Coker. Pt was d/c from previous PCP d/t inappropriate behavior    Hypertension  Patient is here for follow-up of elevated blood pressure. He is exercising and is adherent to a low-salt diet. Blood pressure is well controlled at home. Cardiac symptoms: none. Patient denies chest pain, chest pressure/discomfort, claudication, dyspnea, exertional chest pressure/discomfort, irregular heart beat, lower extremity edema, near-syncope, orthopnea and palpitations. Cardiovascular risk factors: advanced age (older than 54 for men, 72 for women), hypertension and male gender. Use of agents associated with hypertension: none. Pt has a h/o CAD. Pt was prescribed both Metoprolol and Lisinopril, pt stated he has not taken either medication for approximately 6 months, BP today is 110/72, will have pt continue Lisinopril daily along with daily aspirin    Pt also has a h/o Seizure Disorder and has not taken Dilantin for approximately 6 months. pt's last seizure was 10 yrs ago. According to medical records, it appears pt has never been therapeutic while on Dilantin, pt admits he was not taking medication as ordered daily, Will have pt continue to stop Dilantin since he has not taken for 6 months      Pt is due for Preventative Labs including PSA, he does c/o Urinary Hesitancy for the past several months, he denies any other urinary issues        [address]:   Patient's past medical, surgical, social and/or family history reviewed, updated in chart, and are non-contributory (unless otherwise stated). Medications and allergies also reviewed and updated in chart. Review of Systems  Review of Systems   Constitutional: Negative for activity change, appetite change, chills, diaphoresis, fatigue, fever and unexpected weight change.    HENT: Negative for congestion, ear discharge, ear pain, facial swelling, hearing loss, mouth sores, nosebleeds, postnasal drip, rhinorrhea, sinus pressure, sinus pain, sneezing, sore throat, tinnitus, trouble swallowing and voice change. Eyes: Negative for photophobia, pain, discharge, redness, itching and visual disturbance. Respiratory: Negative for apnea, cough, choking, chest tightness, shortness of breath, wheezing and stridor. Cardiovascular: Negative for chest pain, palpitations and leg swelling. H/o CAD   Gastrointestinal: Negative for abdominal distention, abdominal pain, anal bleeding, blood in stool, constipation, diarrhea, nausea, rectal pain and vomiting. Endocrine: Negative for cold intolerance, heat intolerance, polydipsia, polyphagia and polyuria. Genitourinary: Positive for difficulty urinating (c/o urinary hesitancy). Negative for decreased urine volume, discharge, dysuria, enuresis, flank pain, frequency, hematuria, penile pain, penile swelling, scrotal swelling, testicular pain and urgency. Musculoskeletal: Negative for myalgias. Skin: Negative for color change, pallor and rash. Neurological: Negative for dizziness, tremors, seizures, syncope, facial asymmetry, speech difficulty, weakness, light-headedness, numbness and headaches. H/o Seizure Disorder   Hematological: Negative for adenopathy. Does not bruise/bleed easily. Psychiatric/Behavioral: Negative for agitation, behavioral problems, confusion, decreased concentration, dysphoric mood, hallucinations, self-injury, sleep disturbance and suicidal ideas. The patient is not nervous/anxious.         Physical Exam:    VS:  /72 (Site: Left Upper Arm, Position: Sitting, Cuff Size: Medium Adult)   Pulse 60   Temp 97.9 °F (36.6 °C) (Oral)   Resp 16   Ht 6' (1.829 m)   Wt 175 lb (79.4 kg)   SpO2 96%   BMI 23.73 kg/m²   LAST WEIGHT:  Wt Readings from Last 3 Encounters:   05/15/19 175 lb (79.4 kg)   04/14/19 170 lb (77.1 kg)   08/01/18 167 lb (75.8 kg)     Physical Exam   Constitutional: He is oriented to person, place, and time. He appears well-developed and well-nourished. No distress. Elderly male   HENT:   Head: Normocephalic and atraumatic. Right Ear: External ear normal.   Left Ear: External ear normal.   Nose: Nose normal.   Mouth/Throat: Oropharynx is clear and moist. No oropharyngeal exudate. Eyes: Pupils are equal, round, and reactive to light. Conjunctivae and EOM are normal. Right eye exhibits no discharge. Left eye exhibits no discharge. Neck: Normal range of motion. Neck supple. No JVD present. No thyromegaly present. Cardiovascular: Normal rate, regular rhythm, normal heart sounds and intact distal pulses. No murmur heard. No peripheral edema   Pulmonary/Chest: Effort normal and breath sounds normal. No stridor. No respiratory distress. He has no wheezes. He has no rales. He exhibits no tenderness. Abdominal: Soft. Bowel sounds are normal. He exhibits no distension and no mass. There is no tenderness. There is no rebound. Musculoskeletal: Normal range of motion. He exhibits no edema, tenderness or deformity. Lymphadenopathy:     He has no cervical adenopathy. Neurological: He is alert and oriented to person, place, and time. He has normal reflexes. He displays normal reflexes. No cranial nerve deficit or sensory deficit. He exhibits normal muscle tone. Coordination normal.   Skin: Skin is warm and dry. No rash noted. He is not diaphoretic. No erythema. No pallor. Psychiatric: He has a normal mood and affect. His behavior is normal. Judgment and thought content normal.   Pleasant and cooperative, answers questions appropriately, recalls both recent/past events w/o difficulty. Smiling, hygiene-well kept   Nursing note and vitals reviewed. Assessment / Plan:      Mariangel Barber was seen today for establish care.     Diagnoses and all orders for this visit:    Healthcare maintenance  - screening exams and vaccinations. Advised patient regarding importance of keeping up with recommended health maintenance and to schedule as soon as possible if overdue, as this is important in assessing for undiagnosed pathology, especially cancer, as well as protecting against potentially harmful/life threatening disease. Patient and/or guardian verbalizes understanding and agrees with above counseling, assessment and plan. All questions answered.     Mariano Charles, APRN - CNP

## 2019-05-16 DIAGNOSIS — R39.11 URINARY HESITANCY: Primary | ICD-10-CM

## 2019-05-16 DIAGNOSIS — R97.20 ELEVATED PSA, GREATER THAN OR EQUAL TO 20 NG/ML: Primary | ICD-10-CM

## 2019-05-16 RX ORDER — TAMSULOSIN HYDROCHLORIDE 0.4 MG/1
0.4 CAPSULE ORAL DAILY
Qty: 90 CAPSULE | Refills: 0 | Status: SHIPPED | OUTPATIENT
Start: 2019-05-16 | End: 2019-06-03 | Stop reason: SDUPTHER

## 2019-05-27 ASSESSMENT — ENCOUNTER SYMPTOMS
STRIDOR: 0
FACIAL SWELLING: 0
WHEEZING: 0
NAUSEA: 0
CHEST TIGHTNESS: 0
ANAL BLEEDING: 0
ABDOMINAL DISTENTION: 0
PHOTOPHOBIA: 0
VOMITING: 0
SINUS PAIN: 0
CHOKING: 0
RECTAL PAIN: 0
COUGH: 0
EYE REDNESS: 0
SORE THROAT: 0
SINUS PRESSURE: 0
COLOR CHANGE: 0
DIARRHEA: 0
ABDOMINAL PAIN: 0
EYE PAIN: 0
RHINORRHEA: 0
APNEA: 0
VOICE CHANGE: 0
TROUBLE SWALLOWING: 0
BLOOD IN STOOL: 0
CONSTIPATION: 0
EYE DISCHARGE: 0
EYE ITCHING: 0
SHORTNESS OF BREATH: 0

## 2019-06-03 ENCOUNTER — OFFICE VISIT (OUTPATIENT)
Dept: FAMILY MEDICINE CLINIC | Age: 84
End: 2019-06-03
Payer: MEDICARE

## 2019-06-03 VITALS
RESPIRATION RATE: 16 BRPM | HEIGHT: 72 IN | OXYGEN SATURATION: 99 % | SYSTOLIC BLOOD PRESSURE: 146 MMHG | TEMPERATURE: 96.8 F | WEIGHT: 174 LBS | HEART RATE: 77 BPM | DIASTOLIC BLOOD PRESSURE: 88 MMHG | BODY MASS INDEX: 23.57 KG/M2

## 2019-06-03 DIAGNOSIS — R39.11 URINARY HESITANCY: ICD-10-CM

## 2019-06-03 DIAGNOSIS — I10 ESSENTIAL HYPERTENSION: ICD-10-CM

## 2019-06-03 DIAGNOSIS — R97.20 ELEVATED PSA, GREATER THAN OR EQUAL TO 20 NG/ML: ICD-10-CM

## 2019-06-03 DIAGNOSIS — M13.0 ARTHRITIS OF MULTIPLE SITES: ICD-10-CM

## 2019-06-03 PROCEDURE — 1123F ACP DISCUSS/DSCN MKR DOCD: CPT | Performed by: NURSE PRACTITIONER

## 2019-06-03 PROCEDURE — 1036F TOBACCO NON-USER: CPT | Performed by: NURSE PRACTITIONER

## 2019-06-03 PROCEDURE — G8420 CALC BMI NORM PARAMETERS: HCPCS | Performed by: NURSE PRACTITIONER

## 2019-06-03 PROCEDURE — 99214 OFFICE O/P EST MOD 30 MIN: CPT | Performed by: NURSE PRACTITIONER

## 2019-06-03 PROCEDURE — G8598 ASA/ANTIPLAT THER USED: HCPCS | Performed by: NURSE PRACTITIONER

## 2019-06-03 PROCEDURE — 4040F PNEUMOC VAC/ADMIN/RCVD: CPT | Performed by: NURSE PRACTITIONER

## 2019-06-03 PROCEDURE — G8427 DOCREV CUR MEDS BY ELIG CLIN: HCPCS | Performed by: NURSE PRACTITIONER

## 2019-06-03 RX ORDER — CELECOXIB 100 MG/1
100 CAPSULE ORAL 2 TIMES DAILY
Qty: 180 CAPSULE | Refills: 1 | Status: SHIPPED
Start: 2019-06-03 | End: 2022-09-20

## 2019-06-03 RX ORDER — LISINOPRIL AND HYDROCHLOROTHIAZIDE 20; 12.5 MG/1; MG/1
1 TABLET ORAL DAILY
Qty: 90 TABLET | Refills: 1 | Status: ON HOLD
Start: 2019-06-03 | End: 2022-08-21 | Stop reason: SDUPTHER

## 2019-06-03 RX ORDER — TAMSULOSIN HYDROCHLORIDE 0.4 MG/1
0.4 CAPSULE ORAL DAILY
Qty: 90 CAPSULE | Refills: 0 | Status: SHIPPED | OUTPATIENT
Start: 2019-06-03 | End: 2019-09-04 | Stop reason: SDUPTHER

## 2019-06-03 NOTE — PROGRESS NOTES
Jen Smith is a 80 y.o. male who presents today for   Chief Complaint   Patient presents with    Hypertension    Joint Pain    Other     urinary hesitancy         HPI      Hypertension  Patient is here for follow-up of elevated blood pressure. He is not exercising and is adherent to a low-salt diet. Blood pressure is not well controlled at home. Cardiac symptoms: none. Patient denies chest pain, chest pressure/discomfort, claudication, dyspnea, irregular heart beat, lower extremity edema, near-syncope, orthopnea, palpitations, paroxysmal nocturnal dyspnea and syncope. Cardiovascular risk factors: advanced age (older than 54 for men, 72 for women), hypertension, male gender and sedentary lifestyle. Use of agents associated with hypertension: none. Discussed stopping Lisinopril 5 mg daily to Lisinopril/HCTZ 20/12.5 mg daily, pt is agreeable    Urinary Hesitancy/Elevated PSA  Pt recently had PSA completed: 24.29 on 5/15/19, pt has been referred to Urology-Dr. Kaylene Elaine but has not yet been scheduled for appointment. Pt does have Urinary Hesitancy and advised on need to start Flomax, pt is agreeable, pt denies any other urinary   issues today    Arthritis  Pt c/o generalized achy-like pain to joints of BUE/BLE. Denies redness/warmth or edema to joints. Pt does take OTC motrin w/minimal relief. Discussed starting Celebrex, pt is agreeable    [address]:  Patient's past medical, surgical, social and/or family history reviewed, updated in chart, and are non-contributory (unless otherwise stated). Medications and allergies also reviewed and updated in chart. Review of Systems  Review of Systems   Constitutional: Negative for activity change, appetite change, chills, diaphoresis, fatigue, fever and unexpected weight change.    HENT: Negative for congestion, ear discharge, ear pain, facial swelling, hearing loss, mouth sores, nosebleeds, postnasal drip, rhinorrhea, sinus pressure, sinus pain, sneezing, sore throat, tinnitus, trouble swallowing and voice change. Eyes: Negative for photophobia, pain, discharge, redness, itching and visual disturbance. Respiratory: Negative for apnea, cough, choking, chest tightness, shortness of breath, wheezing and stridor. Cardiovascular: Negative for chest pain, palpitations and leg swelling. H/o CAD   Gastrointestinal: Negative for abdominal distention, abdominal pain, anal bleeding, blood in stool, constipation, diarrhea, nausea, rectal pain and vomiting. Endocrine: Negative for cold intolerance, heat intolerance, polydipsia, polyphagia and polyuria. Genitourinary: Positive for difficulty urinating (c/o urinary hesitancy). Negative for decreased urine volume, discharge, dysuria, enuresis, flank pain, frequency, hematuria, penile pain, penile swelling, scrotal swelling, testicular pain and urgency. H/o Elevated PSA   Musculoskeletal: Positive for arthralgias (multiple joints). Negative for myalgias. Skin: Negative for color change, pallor and rash. Neurological: Negative for dizziness, tremors, seizures, syncope, facial asymmetry, speech difficulty, weakness, light-headedness, numbness and headaches. H/o Seizure Disorder   Hematological: Negative for adenopathy. Does not bruise/bleed easily. Psychiatric/Behavioral: Negative for agitation, behavioral problems, confusion, decreased concentration, dysphoric mood, hallucinations, self-injury, sleep disturbance and suicidal ideas. The patient is not nervous/anxious. Physical Exam:    VS:  BP (!) 146/88   Pulse 77   Temp 96.8 °F (36 °C) (Temporal)   Resp 16   Ht 6' (1.829 m)   Wt 174 lb (78.9 kg)   SpO2 99%   BMI 23.60 kg/m²   LAST WEIGHT:  Wt Readings from Last 3 Encounters:   06/03/19 174 lb (78.9 kg)   05/15/19 175 lb (79.4 kg)   04/14/19 170 lb (77.1 kg)     Physical Exam   Constitutional: He is oriented to person, place, and time. He appears well-developed and well-nourished.  No distress. Elderly male   HENT:   Head: Normocephalic and atraumatic. Right Ear: External ear normal.   Left Ear: External ear normal.   Nose: Nose normal.   Mouth/Throat: Oropharynx is clear and moist. No oropharyngeal exudate. Eyes: Pupils are equal, round, and reactive to light. Conjunctivae and EOM are normal. Right eye exhibits no discharge. Left eye exhibits no discharge. Neck: Normal range of motion. Neck supple. No JVD present. No thyromegaly present. Cardiovascular: Normal rate, regular rhythm, normal heart sounds and intact distal pulses. No murmur heard. No peripheral edema   Pulmonary/Chest: Effort normal and breath sounds normal. No stridor. No respiratory distress. He has no wheezes. He has no rales. He exhibits no tenderness. Abdominal: Soft. Bowel sounds are normal. He exhibits no distension and no mass. There is no tenderness (no suprapubic or CVA tenderness present). There is no rebound and no guarding. Musculoskeletal:   Generalized tenderness present to joints of BUE/BLE, no erythema/edema or deformities present, full ROM, slow in ambulating and changing positions   Lymphadenopathy:     He has no cervical adenopathy. Neurological: He is alert and oriented to person, place, and time. He has normal reflexes. He displays normal reflexes. No cranial nerve deficit or sensory deficit. He exhibits normal muscle tone. Coordination normal.   Skin: Skin is warm and dry. No rash noted. He is not diaphoretic. No erythema. No pallor. Psychiatric: He has a normal mood and affect. His behavior is normal. Judgment and thought content normal.   Pleasant and cooperative, answers questions appropriately, recalls both recent/past events w/o difficulty. Smiling, hygiene-well kept   Nursing note and vitals reviewed. Assessment / Plan:      Michela Chang was seen today for hypertension.     Diagnoses and all orders for this visit:    Essential hypertension  Will stop Lisinopril 5 mg daily and start

## 2019-06-06 ASSESSMENT — ENCOUNTER SYMPTOMS
WHEEZING: 0
EYE ITCHING: 0
SHORTNESS OF BREATH: 0
BLOOD IN STOOL: 0
DIARRHEA: 0
NAUSEA: 0
CHEST TIGHTNESS: 0
SORE THROAT: 0
SINUS PRESSURE: 0
ABDOMINAL PAIN: 0
EYE PAIN: 0
TROUBLE SWALLOWING: 0
FACIAL SWELLING: 0
ANAL BLEEDING: 0
ABDOMINAL DISTENTION: 0
COUGH: 0
VOMITING: 0
SINUS PAIN: 0
CONSTIPATION: 0
EYE REDNESS: 0
RECTAL PAIN: 0
PHOTOPHOBIA: 0
VOICE CHANGE: 0
EYE DISCHARGE: 0
APNEA: 0
STRIDOR: 0
COLOR CHANGE: 0
CHOKING: 0
RHINORRHEA: 0

## 2019-09-04 ENCOUNTER — OFFICE VISIT (OUTPATIENT)
Dept: FAMILY MEDICINE CLINIC | Age: 84
End: 2019-09-04
Payer: MEDICARE

## 2019-09-04 ENCOUNTER — HOSPITAL ENCOUNTER (OUTPATIENT)
Age: 84
Discharge: HOME | End: 2019-09-06
Payer: MEDICARE

## 2019-09-04 VITALS
HEART RATE: 54 BPM | BODY MASS INDEX: 23.46 KG/M2 | DIASTOLIC BLOOD PRESSURE: 82 MMHG | RESPIRATION RATE: 16 BRPM | SYSTOLIC BLOOD PRESSURE: 132 MMHG | OXYGEN SATURATION: 98 % | WEIGHT: 173 LBS

## 2019-09-04 DIAGNOSIS — I10 ESSENTIAL HYPERTENSION: ICD-10-CM

## 2019-09-04 DIAGNOSIS — R53.83 FATIGUE, UNSPECIFIED TYPE: ICD-10-CM

## 2019-09-04 DIAGNOSIS — I25.10 CORONARY ARTERY DISEASE INVOLVING NATIVE CORONARY ARTERY OF NATIVE HEART WITHOUT ANGINA PECTORIS: ICD-10-CM

## 2019-09-04 DIAGNOSIS — R97.20 ELEVATED PSA, GREATER THAN OR EQUAL TO 20 NG/ML: ICD-10-CM

## 2019-09-04 DIAGNOSIS — R39.11 URINARY HESITANCY: ICD-10-CM

## 2019-09-04 DIAGNOSIS — Z00.00 HEALTH CARE MAINTENANCE: Primary | ICD-10-CM

## 2019-09-04 LAB
ALBUMIN SERPL-MCNC: 4.3 G/DL (ref 3.5–5.2)
ALP BLD-CCNC: 79 U/L (ref 40–129)
ALT SERPL-CCNC: 7 U/L (ref 0–40)
ANION GAP SERPL CALCULATED.3IONS-SCNC: 10 MMOL/L (ref 7–16)
AST SERPL-CCNC: 10 U/L (ref 0–39)
BASOPHILS ABSOLUTE: 0.06 E9/L (ref 0–0.2)
BASOPHILS RELATIVE PERCENT: 0.5 % (ref 0–2)
BILIRUB SERPL-MCNC: 0.3 MG/DL (ref 0–1.2)
BUN BLDV-MCNC: 16 MG/DL (ref 8–23)
CALCIUM SERPL-MCNC: 9.4 MG/DL (ref 8.6–10.2)
CHLORIDE BLD-SCNC: 102 MMOL/L (ref 98–107)
CO2: 28 MMOL/L (ref 22–29)
CREAT SERPL-MCNC: 1.2 MG/DL (ref 0.7–1.2)
EOSINOPHILS ABSOLUTE: 0.21 E9/L (ref 0.05–0.5)
EOSINOPHILS RELATIVE PERCENT: 1.8 % (ref 0–6)
GFR AFRICAN AMERICAN: >60
GFR NON-AFRICAN AMERICAN: 57 ML/MIN/1.73
GLUCOSE BLD-MCNC: 106 MG/DL (ref 74–99)
HCT VFR BLD CALC: 38.8 % (ref 37–54)
HEMOGLOBIN: 11.8 G/DL (ref 12.5–16.5)
IMMATURE GRANULOCYTES #: 0.06 E9/L
IMMATURE GRANULOCYTES %: 0.5 % (ref 0–5)
LYMPHOCYTES ABSOLUTE: 1.31 E9/L (ref 1.5–4)
LYMPHOCYTES RELATIVE PERCENT: 11.5 % (ref 20–42)
MCH RBC QN AUTO: 27 PG (ref 26–35)
MCHC RBC AUTO-ENTMCNC: 30.4 % (ref 32–34.5)
MCV RBC AUTO: 88.8 FL (ref 80–99.9)
MONOCYTES ABSOLUTE: 0.73 E9/L (ref 0.1–0.95)
MONOCYTES RELATIVE PERCENT: 6.4 % (ref 2–12)
NEUTROPHILS ABSOLUTE: 9.01 E9/L (ref 1.8–7.3)
NEUTROPHILS RELATIVE PERCENT: 79.3 % (ref 43–80)
PDW BLD-RTO: 14.6 FL (ref 11.5–15)
PLATELET # BLD: 218 E9/L (ref 130–450)
PMV BLD AUTO: 10.9 FL (ref 7–12)
POTASSIUM SERPL-SCNC: 4.2 MMOL/L (ref 3.5–5)
RBC # BLD: 4.37 E12/L (ref 3.8–5.8)
SODIUM BLD-SCNC: 140 MMOL/L (ref 132–146)
TOTAL PROTEIN: 7.6 G/DL (ref 6.4–8.3)
TSH SERPL DL<=0.05 MIU/L-ACNC: 1.21 UIU/ML (ref 0.27–4.2)
WBC # BLD: 11.4 E9/L (ref 4.5–11.5)

## 2019-09-04 PROCEDURE — 84154 ASSAY OF PSA FREE: CPT

## 2019-09-04 PROCEDURE — 99214 OFFICE O/P EST MOD 30 MIN: CPT | Performed by: NURSE PRACTITIONER

## 2019-09-04 PROCEDURE — 1036F TOBACCO NON-USER: CPT | Performed by: NURSE PRACTITIONER

## 2019-09-04 PROCEDURE — 1123F ACP DISCUSS/DSCN MKR DOCD: CPT | Performed by: NURSE PRACTITIONER

## 2019-09-04 PROCEDURE — 84443 ASSAY THYROID STIM HORMONE: CPT

## 2019-09-04 PROCEDURE — G8427 DOCREV CUR MEDS BY ELIG CLIN: HCPCS | Performed by: NURSE PRACTITIONER

## 2019-09-04 PROCEDURE — 85025 COMPLETE CBC W/AUTO DIFF WBC: CPT

## 2019-09-04 PROCEDURE — 84153 ASSAY OF PSA TOTAL: CPT

## 2019-09-04 PROCEDURE — G8598 ASA/ANTIPLAT THER USED: HCPCS | Performed by: NURSE PRACTITIONER

## 2019-09-04 PROCEDURE — G8420 CALC BMI NORM PARAMETERS: HCPCS | Performed by: NURSE PRACTITIONER

## 2019-09-04 PROCEDURE — 80053 COMPREHEN METABOLIC PANEL: CPT

## 2019-09-04 PROCEDURE — 4040F PNEUMOC VAC/ADMIN/RCVD: CPT | Performed by: NURSE PRACTITIONER

## 2019-09-04 RX ORDER — TAMSULOSIN HYDROCHLORIDE 0.4 MG/1
0.4 CAPSULE ORAL DAILY
Qty: 90 CAPSULE | Refills: 1 | Status: ON HOLD
Start: 2019-09-04 | End: 2022-08-21 | Stop reason: SDUPTHER

## 2019-09-04 NOTE — PROGRESS NOTES
Melissa Oliva is a 80 y.o. male who presents today for   Chief Complaint   Patient presents with    Health Maintenance    Hypertension    Fatigue    Elevated PSA     non compliance w/urology referral         HPI    Hypertension  Patient is here for follow-up of elevated blood pressure. He is not exercising and is adherent to a low-salt diet. Blood pressure is well controlled at home. Cardiac symptoms: none. Patient denies chest pain, chest pressure/discomfort, dyspnea, exertional chest pressure/discomfort, irregular heart beat, lower extremity edema, orthopnea, palpitations and paroxysmal nocturnal dyspnea. Cardiovascular risk factors: advanced age (older than 54 for men, 72 for women), hypertension, male gender and sedentary lifestyle. Use of agents associated with hypertension: none. Fatigue  Pt c/o increased fatigue over the past several months. Pt stated he is getting 7-8 hrs of sleep at night uninterrupted. Pt often feels tired throughout the day    Elevated PSA  Pt had elevated PSA on 5/15/19- 24.29, pt was referred to Dr. Agustin Hodgkins for further evaluation however, pt appears confused regarding referral, pt stated he has not seen Urology and denies receiving a phone call for appointment, will reorder PSA today and most likely refer pt again to Urology, Pt dos have urinary hesitancy and is currently on Flomax with improvement. Pt denies any other urinary issues at today visit. Pt is due for Preventative Labs ad is agreeable to have ordered    [address]:  Patient's past medical, surgical, social and/or family history reviewed, updated in chart, and are non-contributory (unless otherwise stated). Medications and allergies also reviewed and updated in chart. Review of Systems  Review of Systems   Constitutional: Negative for activity change, appetite change, chills, diaphoresis, fatigue, fever and unexpected weight change.    HENT: Negative for congestion, ear discharge, ear pain, facial swelling, well as protecting against potentially harmful/life threatening disease. Patient and/or guardian verbalizes understanding and agrees with above counseling, assessment and plan. All questions answered.     Noel Mcdaniel, AMERICO - CNP

## 2019-09-06 LAB
PROSTATE SPECIFIC ANTIGEN FREE: 4.3 NG/ML
PROSTATE SPECIFIC ANTIGEN PERCENT FREE: 16 %
PROSTATE SPECIFIC ANTIGEN: 27.4 NG/ML (ref 0–4)

## 2019-09-09 ENCOUNTER — APPOINTMENT (OUTPATIENT)
Dept: CT IMAGING | Age: 84
End: 2019-09-09
Payer: MEDICARE

## 2019-09-09 ENCOUNTER — HOSPITAL ENCOUNTER (EMERGENCY)
Age: 84
Discharge: HOME | End: 2019-09-09
Attending: EMERGENCY MEDICINE
Payer: MEDICARE

## 2019-09-09 VITALS
BODY MASS INDEX: 23.3 KG/M2 | WEIGHT: 172 LBS | TEMPERATURE: 98.7 F | HEIGHT: 72 IN | HEART RATE: 55 BPM | OXYGEN SATURATION: 98 % | RESPIRATION RATE: 18 BRPM | SYSTOLIC BLOOD PRESSURE: 115 MMHG | DIASTOLIC BLOOD PRESSURE: 72 MMHG

## 2019-09-09 DIAGNOSIS — L73.9 FOLLICULITIS: ICD-10-CM

## 2019-09-09 DIAGNOSIS — L03.818 CELLULITIS OF OTHER SPECIFIED SITE: Primary | ICD-10-CM

## 2019-09-09 LAB
ANION GAP SERPL CALCULATED.3IONS-SCNC: 12 MMOL/L (ref 7–16)
BACTERIA: ABNORMAL /HPF
BASOPHILS ABSOLUTE: 0.06 E9/L (ref 0–0.2)
BASOPHILS RELATIVE PERCENT: 0.7 % (ref 0–2)
BILIRUBIN URINE: NEGATIVE
BLOOD, URINE: NEGATIVE
BUN BLDV-MCNC: 18 MG/DL (ref 8–23)
CALCIUM SERPL-MCNC: 9.5 MG/DL (ref 8.6–10.2)
CHLORIDE BLD-SCNC: 99 MMOL/L (ref 98–107)
CLARITY: CLEAR
CO2: 26 MMOL/L (ref 22–29)
COLOR: YELLOW
CREAT SERPL-MCNC: 1.2 MG/DL (ref 0.7–1.2)
EOSINOPHILS ABSOLUTE: 0.27 E9/L (ref 0.05–0.5)
EOSINOPHILS RELATIVE PERCENT: 3 % (ref 0–6)
EPITHELIAL CELLS, UA: ABNORMAL /HPF
GFR AFRICAN AMERICAN: >60
GFR NON-AFRICAN AMERICAN: 57 ML/MIN/1.73
GLUCOSE BLD-MCNC: 109 MG/DL (ref 74–99)
GLUCOSE URINE: NEGATIVE MG/DL
HCT VFR BLD CALC: 37.7 % (ref 37–54)
HEMOGLOBIN: 11.9 G/DL (ref 12.5–16.5)
IMMATURE GRANULOCYTES #: 0.04 E9/L
IMMATURE GRANULOCYTES %: 0.4 % (ref 0–5)
KETONES, URINE: NEGATIVE MG/DL
LEUKOCYTE ESTERASE, URINE: NEGATIVE
LYMPHOCYTES ABSOLUTE: 1.1 E9/L (ref 1.5–4)
LYMPHOCYTES RELATIVE PERCENT: 12.3 % (ref 20–42)
MCH RBC QN AUTO: 27.6 PG (ref 26–35)
MCHC RBC AUTO-ENTMCNC: 31.6 % (ref 32–34.5)
MCV RBC AUTO: 87.5 FL (ref 80–99.9)
MONOCYTES ABSOLUTE: 0.45 E9/L (ref 0.1–0.95)
MONOCYTES RELATIVE PERCENT: 5 % (ref 2–12)
NEUTROPHILS ABSOLUTE: 7.05 E9/L (ref 1.8–7.3)
NEUTROPHILS RELATIVE PERCENT: 78.6 % (ref 43–80)
NITRITE, URINE: NEGATIVE
PDW BLD-RTO: 14.1 FL (ref 11.5–15)
PH UA: 6 (ref 5–9)
PLATELET # BLD: 275 E9/L (ref 130–450)
PMV BLD AUTO: 10.3 FL (ref 7–12)
POTASSIUM SERPL-SCNC: 4 MMOL/L (ref 3.5–5)
PROTEIN UA: NEGATIVE MG/DL
RBC # BLD: 4.31 E12/L (ref 3.8–5.8)
RBC UA: ABNORMAL /HPF (ref 0–2)
SODIUM BLD-SCNC: 137 MMOL/L (ref 132–146)
SPECIFIC GRAVITY UA: 1.02 (ref 1–1.03)
TROPONIN: <0.01 NG/ML (ref 0–0.03)
UROBILINOGEN, URINE: 1 E.U./DL
WBC # BLD: 9 E9/L (ref 4.5–11.5)
WBC UA: ABNORMAL /HPF (ref 0–5)

## 2019-09-09 PROCEDURE — 93005 ELECTROCARDIOGRAM TRACING: CPT | Performed by: EMERGENCY MEDICINE

## 2019-09-09 PROCEDURE — 80048 BASIC METABOLIC PNL TOTAL CA: CPT

## 2019-09-09 PROCEDURE — 70450 CT HEAD/BRAIN W/O DYE: CPT

## 2019-09-09 PROCEDURE — 84484 ASSAY OF TROPONIN QUANT: CPT

## 2019-09-09 PROCEDURE — 36415 COLL VENOUS BLD VENIPUNCTURE: CPT

## 2019-09-09 PROCEDURE — 81001 URINALYSIS AUTO W/SCOPE: CPT

## 2019-09-09 PROCEDURE — 99284 EMERGENCY DEPT VISIT MOD MDM: CPT

## 2019-09-09 PROCEDURE — 85025 COMPLETE CBC W/AUTO DIFF WBC: CPT

## 2019-09-09 RX ORDER — CEPHALEXIN 500 MG/1
500 CAPSULE ORAL 4 TIMES DAILY
Qty: 28 CAPSULE | Refills: 0 | Status: SHIPPED | OUTPATIENT
Start: 2019-09-09 | End: 2019-09-16

## 2019-09-09 ASSESSMENT — PAIN DESCRIPTION - PAIN TYPE: TYPE: ACUTE PAIN

## 2019-09-09 ASSESSMENT — PAIN DESCRIPTION - ORIENTATION: ORIENTATION: RIGHT
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
## 2019-09-10 LAB
EKG ATRIAL RATE: 52 BPM
EKG P AXIS: 67 DEGREES
EKG P-R INTERVAL: 184 MS
EKG Q-T INTERVAL: 446 MS
EKG QRS DURATION: 92 MS
EKG QTC CALCULATION (BAZETT): 414 MS
EKG R AXIS: 13 DEGREES
EKG T AXIS: -144 DEGREES
EKG VENTRICULAR RATE: 52 BPM

## 2019-09-10 PROCEDURE — 93010 ELECTROCARDIOGRAM REPORT: CPT | Performed by: INTERNAL MEDICINE

## 2019-09-14 ASSESSMENT — ENCOUNTER SYMPTOMS
ANAL BLEEDING: 0
RECTAL PAIN: 0
CHEST TIGHTNESS: 0
NAUSEA: 0
SHORTNESS OF BREATH: 0
WHEEZING: 0
EYE REDNESS: 0
EYE DISCHARGE: 0
CONSTIPATION: 0
EYE PAIN: 0
EYE ITCHING: 0
RHINORRHEA: 0
ABDOMINAL PAIN: 0
VOMITING: 0
SORE THROAT: 0
COUGH: 0
APNEA: 0
BLOOD IN STOOL: 0
CHOKING: 0
STRIDOR: 0
SINUS PAIN: 0
VOICE CHANGE: 0
DIARRHEA: 0
PHOTOPHOBIA: 0
ABDOMINAL DISTENTION: 0
FACIAL SWELLING: 0
COLOR CHANGE: 0
TROUBLE SWALLOWING: 0
SINUS PRESSURE: 0

## 2019-12-16 ENCOUNTER — OFFICE VISIT (OUTPATIENT)
Dept: FAMILY MEDICINE CLINIC | Age: 84
End: 2019-12-16
Payer: MEDICARE

## 2019-12-16 ENCOUNTER — HOSPITAL ENCOUNTER (OUTPATIENT)
Age: 84
Discharge: HOME | End: 2019-12-18
Payer: MEDICARE

## 2019-12-16 VITALS
WEIGHT: 178.3 LBS | RESPIRATION RATE: 16 BRPM | OXYGEN SATURATION: 98 % | SYSTOLIC BLOOD PRESSURE: 138 MMHG | BODY MASS INDEX: 24.18 KG/M2 | HEART RATE: 61 BPM | DIASTOLIC BLOOD PRESSURE: 83 MMHG

## 2019-12-16 DIAGNOSIS — R97.20 ELEVATED PSA: ICD-10-CM

## 2019-12-16 DIAGNOSIS — R39.15 URINARY URGENCY: ICD-10-CM

## 2019-12-16 DIAGNOSIS — R31.9 HEMATURIA, UNSPECIFIED TYPE: Primary | ICD-10-CM

## 2019-12-16 DIAGNOSIS — L53.9 FACIAL ERYTHEMA: ICD-10-CM

## 2019-12-16 DIAGNOSIS — L72.3 SEBACEOUS CYST: ICD-10-CM

## 2019-12-16 DIAGNOSIS — R31.9 HEMATURIA, UNSPECIFIED TYPE: ICD-10-CM

## 2019-12-16 DIAGNOSIS — L03.211 CELLULITIS OF FACE: ICD-10-CM

## 2019-12-16 LAB
BILIRUBIN, POC: NEGATIVE
BLOOD URINE, POC: NEGATIVE
CLARITY, POC: CLEAR
COLOR, POC: YELLOW
GLUCOSE URINE, POC: NEGATIVE
KETONES, POC: NEGATIVE
LEUKOCYTE EST, POC: NORMAL
NITRITE, POC: NEGATIVE
PH, POC: 6
PROTEIN, POC: NORMAL
SPECIFIC GRAVITY, POC: 1.02
UROBILINOGEN, POC: NORMAL

## 2019-12-16 PROCEDURE — 81002 URINALYSIS NONAUTO W/O SCOPE: CPT | Performed by: PHYSICIAN ASSISTANT

## 2019-12-16 PROCEDURE — 4040F PNEUMOC VAC/ADMIN/RCVD: CPT | Performed by: PHYSICIAN ASSISTANT

## 2019-12-16 PROCEDURE — 1123F ACP DISCUSS/DSCN MKR DOCD: CPT | Performed by: PHYSICIAN ASSISTANT

## 2019-12-16 PROCEDURE — G8484 FLU IMMUNIZE NO ADMIN: HCPCS | Performed by: PHYSICIAN ASSISTANT

## 2019-12-16 PROCEDURE — 99214 OFFICE O/P EST MOD 30 MIN: CPT | Performed by: PHYSICIAN ASSISTANT

## 2019-12-16 PROCEDURE — G8598 ASA/ANTIPLAT THER USED: HCPCS | Performed by: PHYSICIAN ASSISTANT

## 2019-12-16 PROCEDURE — G8420 CALC BMI NORM PARAMETERS: HCPCS | Performed by: PHYSICIAN ASSISTANT

## 2019-12-16 PROCEDURE — G8427 DOCREV CUR MEDS BY ELIG CLIN: HCPCS | Performed by: PHYSICIAN ASSISTANT

## 2019-12-16 PROCEDURE — 1036F TOBACCO NON-USER: CPT | Performed by: PHYSICIAN ASSISTANT

## 2019-12-16 PROCEDURE — 87088 URINE BACTERIA CULTURE: CPT

## 2019-12-16 RX ORDER — DOXYCYCLINE HYCLATE 100 MG
100 TABLET ORAL 2 TIMES DAILY
Qty: 20 TABLET | Refills: 0 | Status: SHIPPED | OUTPATIENT
Start: 2019-12-16 | End: 2019-12-26

## 2019-12-16 RX ORDER — CEPHALEXIN 500 MG/1
500 CAPSULE ORAL 3 TIMES DAILY
Qty: 30 CAPSULE | Refills: 0 | Status: SHIPPED | OUTPATIENT
Start: 2019-12-16 | End: 2019-12-26

## 2019-12-16 ASSESSMENT — ENCOUNTER SYMPTOMS
BACK PAIN: 0
ABDOMINAL PAIN: 0
EYE REDNESS: 0
DIARRHEA: 0
CONSTIPATION: 0
COLOR CHANGE: 0
SINUS PRESSURE: 0
COUGH: 0
VOMITING: 0
NAUSEA: 0
RHINORRHEA: 0
SORE THROAT: 0
EYE DISCHARGE: 0
FACIAL SWELLING: 1
PHOTOPHOBIA: 0
SHORTNESS OF BREATH: 0
CHEST TIGHTNESS: 0
WHEEZING: 0
BLOOD IN STOOL: 0
EYE PAIN: 0

## 2019-12-18 ENCOUNTER — TELEPHONE (OUTPATIENT)
Dept: FAMILY MEDICINE CLINIC | Age: 84
End: 2019-12-18

## 2019-12-18 LAB — URINE CULTURE, ROUTINE: NORMAL

## 2019-12-29 ASSESSMENT — ENCOUNTER SYMPTOMS
EYE REDNESS: 0
NAUSEA: 0
WHEEZING: 0
SORE THROAT: 0
SHORTNESS OF BREATH: 0
DIARRHEA: 0
EYE DISCHARGE: 0
COUGH: 0
SINUS PRESSURE: 0
VOMITING: 0
ABDOMINAL DISTENTION: 0
EYE PAIN: 0

## 2019-12-30 ENCOUNTER — OFFICE VISIT (OUTPATIENT)
Dept: FAMILY MEDICINE CLINIC | Age: 84
End: 2019-12-30
Payer: MEDICARE

## 2019-12-30 VITALS
TEMPERATURE: 97.2 F | WEIGHT: 174.8 LBS | DIASTOLIC BLOOD PRESSURE: 78 MMHG | HEIGHT: 69 IN | RESPIRATION RATE: 16 BRPM | HEART RATE: 82 BPM | SYSTOLIC BLOOD PRESSURE: 110 MMHG | OXYGEN SATURATION: 98 % | BODY MASS INDEX: 25.89 KG/M2

## 2019-12-30 DIAGNOSIS — Z87.448 HISTORY OF HEMATURIA: ICD-10-CM

## 2019-12-30 DIAGNOSIS — F03.90 DEMENTIA WITHOUT BEHAVIORAL DISTURBANCE, UNSPECIFIED DEMENTIA TYPE: Primary | ICD-10-CM

## 2019-12-30 DIAGNOSIS — Z87.898 HISTORY OF ELEVATED PSA: ICD-10-CM

## 2019-12-30 DIAGNOSIS — F42.4 PICKING OWN SKIN: ICD-10-CM

## 2019-12-30 PROCEDURE — G8427 DOCREV CUR MEDS BY ELIG CLIN: HCPCS | Performed by: PHYSICIAN ASSISTANT

## 2019-12-30 PROCEDURE — 4040F PNEUMOC VAC/ADMIN/RCVD: CPT | Performed by: PHYSICIAN ASSISTANT

## 2019-12-30 PROCEDURE — G8417 CALC BMI ABV UP PARAM F/U: HCPCS | Performed by: PHYSICIAN ASSISTANT

## 2019-12-30 PROCEDURE — 1123F ACP DISCUSS/DSCN MKR DOCD: CPT | Performed by: PHYSICIAN ASSISTANT

## 2019-12-30 PROCEDURE — 99214 OFFICE O/P EST MOD 30 MIN: CPT | Performed by: PHYSICIAN ASSISTANT

## 2019-12-30 PROCEDURE — G8598 ASA/ANTIPLAT THER USED: HCPCS | Performed by: PHYSICIAN ASSISTANT

## 2019-12-30 PROCEDURE — G8484 FLU IMMUNIZE NO ADMIN: HCPCS | Performed by: PHYSICIAN ASSISTANT

## 2019-12-30 PROCEDURE — 1036F TOBACCO NON-USER: CPT | Performed by: PHYSICIAN ASSISTANT

## 2019-12-30 ASSESSMENT — ENCOUNTER SYMPTOMS
CHEST TIGHTNESS: 0
BACK PAIN: 1
VOICE CHANGE: 0
TROUBLE SWALLOWING: 0
ABDOMINAL PAIN: 0

## 2020-03-06 PROBLEM — C61 PROSTATE CANCER (HCC): Status: ACTIVE | Noted: 2020-03-06

## 2020-05-01 ENCOUNTER — HOSPITAL ENCOUNTER (OUTPATIENT)
Age: 85
Discharge: HOME | End: 2020-05-03
Payer: MEDICARE

## 2020-05-01 LAB — PROSTATE SPECIFIC ANTIGEN: 45.72 NG/ML (ref 0–4)

## 2020-05-01 PROCEDURE — 84153 ASSAY OF PSA TOTAL: CPT

## 2020-05-07 ENCOUNTER — HOSPITAL ENCOUNTER (OUTPATIENT)
Dept: INFUSION THERAPY | Age: 85
Setting detail: INFUSION SERIES
Discharge: HOME | End: 2020-05-07
Payer: MEDICARE

## 2020-05-07 VITALS
SYSTOLIC BLOOD PRESSURE: 136 MMHG | RESPIRATION RATE: 20 BRPM | HEART RATE: 72 BPM | DIASTOLIC BLOOD PRESSURE: 86 MMHG | TEMPERATURE: 99.1 F

## 2020-05-07 DIAGNOSIS — C61 PROSTATE CANCER (HCC): Primary | ICD-10-CM

## 2020-05-07 DIAGNOSIS — Z85.46 PERSONAL HISTORY OF MALIGNANT NEOPLASM OF PROSTATE: ICD-10-CM

## 2020-05-07 PROCEDURE — 96402 CHEMO HORMON ANTINEOPL SQ/IM: CPT

## 2020-05-07 PROCEDURE — 6360000002 HC RX W HCPCS: Performed by: UROLOGY

## 2020-05-07 RX ADMIN — LEUPROLIDE ACETATE 45 MG: KIT at 14:23

## 2020-05-07 NOTE — PROGRESS NOTES
Patient has dementia per son. He cannot remember his medications and his son has now taken over his medications and he also does not know what this patient takes. [facility] called and medications reviewed but most medications have . Son will review and call the patients family doctor to go over all medications.

## 2020-06-16 ENCOUNTER — TELEPHONE (OUTPATIENT)
Dept: CARDIOLOGY CLINIC | Age: 85
End: 2020-06-16

## 2020-06-16 NOTE — TELEPHONE ENCOUNTER
Patient was on our recall list and does not wish to schedule at this time.   They will call if visit is needed

## 2020-06-26 ENCOUNTER — HOSPITAL ENCOUNTER (OUTPATIENT)
Age: 85
Discharge: HOME | End: 2020-06-28
Payer: MEDICARE

## 2020-06-26 LAB — PROSTATE SPECIFIC ANTIGEN: 12.68 NG/ML (ref 0–4)

## 2020-06-26 PROCEDURE — 84153 ASSAY OF PSA TOTAL: CPT

## 2020-11-09 ENCOUNTER — HOSPITAL ENCOUNTER (OUTPATIENT)
Dept: INFUSION THERAPY | Age: 85
Setting detail: INFUSION SERIES
Discharge: HOME | End: 2020-11-09
Payer: MEDICARE

## 2020-11-12 ENCOUNTER — HOSPITAL ENCOUNTER (OUTPATIENT)
Dept: INFUSION THERAPY | Age: 85
Setting detail: INFUSION SERIES
Discharge: HOME | End: 2020-11-12
Payer: MEDICARE

## 2020-11-12 VITALS — SYSTOLIC BLOOD PRESSURE: 151 MMHG | HEART RATE: 64 BPM | DIASTOLIC BLOOD PRESSURE: 95 MMHG | RESPIRATION RATE: 18 BRPM

## 2020-11-12 DIAGNOSIS — C61 PROSTATE CANCER (HCC): Primary | ICD-10-CM

## 2020-11-12 DIAGNOSIS — Z85.46 PERSONAL HISTORY OF MALIGNANT NEOPLASM OF PROSTATE: ICD-10-CM

## 2020-11-12 PROCEDURE — 6360000002 HC RX W HCPCS: Performed by: UROLOGY

## 2020-11-12 PROCEDURE — 96401 CHEMO ANTI-NEOPL SQ/IM: CPT

## 2020-11-12 RX ADMIN — LEUPROLIDE ACETATE 45 MG: KIT SUBCUTANEOUS at 15:01

## 2021-05-12 ENCOUNTER — HOSPITAL ENCOUNTER (OUTPATIENT)
Dept: INFUSION THERAPY | Age: 86
Setting detail: INFUSION SERIES
Discharge: HOME | End: 2021-05-12
Payer: MEDICARE

## 2021-05-12 VITALS
OXYGEN SATURATION: 97 % | TEMPERATURE: 97.5 F | DIASTOLIC BLOOD PRESSURE: 94 MMHG | SYSTOLIC BLOOD PRESSURE: 149 MMHG | RESPIRATION RATE: 18 BRPM | HEART RATE: 74 BPM

## 2021-05-12 DIAGNOSIS — C61 PROSTATE CANCER (HCC): Primary | ICD-10-CM

## 2021-05-12 DIAGNOSIS — Z85.46 PERSONAL HISTORY OF MALIGNANT NEOPLASM OF PROSTATE: ICD-10-CM

## 2021-05-12 PROCEDURE — 6360000002 HC RX W HCPCS: Performed by: UROLOGY

## 2021-05-12 PROCEDURE — 96402 CHEMO HORMON ANTINEOPL SQ/IM: CPT

## 2021-05-12 RX ADMIN — LEUPROLIDE ACETATE 45 MG: KIT SUBCUTANEOUS at 09:35

## 2021-11-12 ENCOUNTER — HOSPITAL ENCOUNTER (OUTPATIENT)
Dept: INFUSION THERAPY | Age: 86
Setting detail: INFUSION SERIES
Discharge: HOME | End: 2021-11-12
Payer: MEDICARE

## 2021-11-12 DIAGNOSIS — C61 PROSTATE CANCER (HCC): Primary | ICD-10-CM

## 2021-11-12 DIAGNOSIS — Z85.46 PERSONAL HISTORY OF MALIGNANT NEOPLASM OF PROSTATE: ICD-10-CM

## 2021-12-01 ENCOUNTER — HOSPITAL ENCOUNTER (OUTPATIENT)
Dept: INFUSION THERAPY | Age: 86
Setting detail: INFUSION SERIES
Discharge: HOME | End: 2021-12-01
Payer: MEDICARE

## 2021-12-01 VITALS
DIASTOLIC BLOOD PRESSURE: 76 MMHG | TEMPERATURE: 97.4 F | SYSTOLIC BLOOD PRESSURE: 132 MMHG | RESPIRATION RATE: 18 BRPM | OXYGEN SATURATION: 98 % | HEART RATE: 76 BPM

## 2021-12-01 DIAGNOSIS — C61 PROSTATE CANCER (HCC): Primary | ICD-10-CM

## 2021-12-01 DIAGNOSIS — Z85.46 PERSONAL HISTORY OF MALIGNANT NEOPLASM OF PROSTATE: ICD-10-CM

## 2021-12-01 PROCEDURE — 6360000002 HC RX W HCPCS: Performed by: UROLOGY

## 2021-12-01 PROCEDURE — 96402 CHEMO HORMON ANTINEOPL SQ/IM: CPT

## 2021-12-01 RX ADMIN — LEUPROLIDE ACETATE 45 MG: KIT SUBCUTANEOUS at 15:42

## 2022-01-01 ENCOUNTER — APPOINTMENT (OUTPATIENT)
Dept: CT IMAGING | Age: 87
DRG: 871 | End: 2022-01-01
Payer: MEDICARE

## 2022-01-01 ENCOUNTER — APPOINTMENT (OUTPATIENT)
Dept: GENERAL RADIOLOGY | Age: 87
DRG: 871 | End: 2022-01-01
Payer: MEDICARE

## 2022-01-01 ENCOUNTER — TELEPHONE (OUTPATIENT)
Dept: FAMILY MEDICINE CLINIC | Age: 87
End: 2022-01-01

## 2022-01-01 ENCOUNTER — HOSPITAL ENCOUNTER (INPATIENT)
Age: 87
LOS: 3 days | DRG: 871 | End: 2022-12-25
Attending: EMERGENCY MEDICINE | Admitting: FAMILY MEDICINE
Payer: MEDICARE

## 2022-01-01 VITALS
TEMPERATURE: 97 F | RESPIRATION RATE: 18 BRPM | WEIGHT: 125 LBS | BODY MASS INDEX: 16.93 KG/M2 | HEART RATE: 105 BPM | HEIGHT: 72 IN | SYSTOLIC BLOOD PRESSURE: 68 MMHG | DIASTOLIC BLOOD PRESSURE: 27 MMHG | OXYGEN SATURATION: 91 %

## 2022-01-01 DIAGNOSIS — N39.0 URINARY TRACT INFECTION WITHOUT HEMATURIA, SITE UNSPECIFIED: ICD-10-CM

## 2022-01-01 DIAGNOSIS — R62.7 FAILURE TO THRIVE IN ADULT: ICD-10-CM

## 2022-01-01 DIAGNOSIS — A41.9 SEPTIC SHOCK (HCC): Primary | ICD-10-CM

## 2022-01-01 DIAGNOSIS — N17.9 AKI (ACUTE KIDNEY INJURY) (HCC): ICD-10-CM

## 2022-01-01 DIAGNOSIS — J18.9 PNEUMONIA OF BOTH LUNGS DUE TO INFECTIOUS ORGANISM, UNSPECIFIED PART OF LUNG: ICD-10-CM

## 2022-01-01 DIAGNOSIS — R65.21 SEPTIC SHOCK (HCC): Primary | ICD-10-CM

## 2022-01-01 DIAGNOSIS — B34.8 RHINOVIRUS: ICD-10-CM

## 2022-01-01 LAB
ACANTHOCYTES: ABNORMAL
ACANTHOCYTES: ABNORMAL
ACETAMINOPHEN LEVEL: <5 MCG/ML (ref 10–30)
ADENOVIRUS BY PCR: NOT DETECTED
ALBUMIN SERPL-MCNC: 2.6 G/DL (ref 3.5–5.2)
ALBUMIN SERPL-MCNC: 2.8 G/DL (ref 3.5–5.2)
ALBUMIN SERPL-MCNC: 2.9 G/DL (ref 3.5–5.2)
ALP BLD-CCNC: 610 U/L (ref 40–129)
ALP BLD-CCNC: 630 U/L (ref 40–129)
ALP BLD-CCNC: 649 U/L (ref 40–129)
ALT SERPL-CCNC: 25 U/L (ref 0–40)
ALT SERPL-CCNC: 6 U/L (ref 0–40)
ALT SERPL-CCNC: 8 U/L (ref 0–40)
ANION GAP SERPL CALCULATED.3IONS-SCNC: 14 MMOL/L (ref 7–16)
ANION GAP SERPL CALCULATED.3IONS-SCNC: 18 MMOL/L (ref 7–16)
ANION GAP SERPL CALCULATED.3IONS-SCNC: 19 MMOL/L (ref 7–16)
ANISOCYTOSIS: ABNORMAL
APTT: 27.9 SEC (ref 24.5–35.1)
AST SERPL-CCNC: 13 U/L (ref 0–39)
AST SERPL-CCNC: 19 U/L (ref 0–39)
AST SERPL-CCNC: 63 U/L (ref 0–39)
B.E.: -13.2 MMOL/L (ref -3–3)
B.E.: -8.4 MMOL/L (ref -3–3)
BACTERIA: ABNORMAL /HPF
BASOPHILIC STIPPLING: ABNORMAL
BASOPHILS ABSOLUTE: 0 E9/L (ref 0–0.2)
BASOPHILS RELATIVE PERCENT: 0.1 % (ref 0–2)
BASOPHILS RELATIVE PERCENT: 0.1 % (ref 0–2)
BASOPHILS RELATIVE PERCENT: 0.2 % (ref 0–2)
BILIRUB SERPL-MCNC: 0.4 MG/DL (ref 0–1.2)
BILIRUB SERPL-MCNC: 0.4 MG/DL (ref 0–1.2)
BILIRUB SERPL-MCNC: 0.5 MG/DL (ref 0–1.2)
BILIRUBIN URINE: NEGATIVE
BLOOD, URINE: ABNORMAL
BORDETELLA PARAPERTUSSIS BY PCR: NOT DETECTED
BORDETELLA PERTUSSIS BY PCR: NOT DETECTED
BUN BLDV-MCNC: 67 MG/DL (ref 6–23)
BUN BLDV-MCNC: 73 MG/DL (ref 6–23)
BUN BLDV-MCNC: 77 MG/DL (ref 6–23)
BURR CELLS: ABNORMAL
CALCIUM IONIZED: 1.27 MMOL/L (ref 1.15–1.33)
CALCIUM IONIZED: 1.3 MMOL/L (ref 1.15–1.33)
CALCIUM SERPL-MCNC: 9.1 MG/DL (ref 8.6–10.2)
CALCIUM SERPL-MCNC: 9.5 MG/DL (ref 8.6–10.2)
CALCIUM SERPL-MCNC: 9.8 MG/DL (ref 8.6–10.2)
CHLAMYDOPHILIA PNEUMONIAE BY PCR: NOT DETECTED
CHLORIDE BLD-SCNC: 100 MMOL/L (ref 98–107)
CHLORIDE BLD-SCNC: 101 MMOL/L (ref 98–107)
CHLORIDE BLD-SCNC: 97 MMOL/L (ref 98–107)
CHLORIDE URINE RANDOM: 21 MMOL/L
CLARITY: ABNORMAL
CO2: 11 MMOL/L (ref 22–29)
CO2: 12 MMOL/L (ref 22–29)
CO2: 17 MMOL/L (ref 22–29)
COHB: 0.2 % (ref 0–1.5)
COHB: 2.8 % (ref 0–1.5)
COLOR: YELLOW
COMMENT: ABNORMAL
CORONAVIRUS 229E BY PCR: NOT DETECTED
CORONAVIRUS HKU1 BY PCR: NOT DETECTED
CORONAVIRUS NL63 BY PCR: NOT DETECTED
CORONAVIRUS OC43 BY PCR: NOT DETECTED
CORTISOL TOTAL: 24.52 MCG/DL (ref 2.68–18.4)
CREAT SERPL-MCNC: 1.7 MG/DL (ref 0.7–1.2)
CREATININE URINE: 74 MG/DL (ref 40–278)
CRITICAL: ABNORMAL
CRITICAL: ABNORMAL
DATE ANALYZED: ABNORMAL
DATE ANALYZED: ABNORMAL
DATE OF COLLECTION: ABNORMAL
DATE OF COLLECTION: ABNORMAL
EOSINOPHILS ABSOLUTE: 0 E9/L (ref 0.05–0.5)
EOSINOPHILS RELATIVE PERCENT: 0 % (ref 0–6)
EOSINOPHILS RELATIVE PERCENT: 0 % (ref 0–6)
EOSINOPHILS RELATIVE PERCENT: 0.2 % (ref 0–6)
ETHANOL: <10 MG/DL (ref 0–0.08)
GFR SERPL CREATININE-BSD FRML MDRD: 38 ML/MIN/1.73
GLUCOSE BLD-MCNC: 123 MG/DL (ref 74–99)
GLUCOSE BLD-MCNC: 201 MG/DL (ref 74–99)
GLUCOSE BLD-MCNC: 234 MG/DL (ref 74–99)
GLUCOSE URINE: NEGATIVE MG/DL
HCO3: 10.8 MMOL/L (ref 22–26)
HCO3: 17.1 MMOL/L (ref 22–26)
HCT VFR BLD CALC: 25.1 % (ref 37–54)
HCT VFR BLD CALC: 26.1 % (ref 37–54)
HCT VFR BLD CALC: 26.2 % (ref 37–54)
HEMOGLOBIN: 7.8 G/DL (ref 12.5–16.5)
HEMOGLOBIN: 8 G/DL (ref 12.5–16.5)
HEMOGLOBIN: 8.3 G/DL (ref 12.5–16.5)
HHB: 57.4 % (ref 0–5)
HHB: 8.6 % (ref 0–5)
HUMAN METAPNEUMOVIRUS BY PCR: NOT DETECTED
HUMAN RHINOVIRUS/ENTEROVIRUS BY PCR: DETECTED
HYPOCHROMIA: ABNORMAL
INFLUENZA A BY PCR: NOT DETECTED
INFLUENZA B BY PCR: NOT DETECTED
INR BLD: 1.7
KETONES, URINE: NEGATIVE MG/DL
L. PNEUMOPHILA SEROGP 1 UR AG: NORMAL
LAB: ABNORMAL
LAB: ABNORMAL
LACTIC ACID: 2.9 MMOL/L (ref 0.5–2.2)
LACTIC ACID: 2.9 MMOL/L (ref 0.5–2.2)
LACTIC ACID: 3.9 MMOL/L (ref 0.5–2.2)
LACTIC ACID: 4 MMOL/L (ref 0.5–2.2)
LACTIC ACID: 4.4 MMOL/L (ref 0.5–2.2)
LEUKOCYTE ESTERASE, URINE: ABNORMAL
LYMPHOCYTES ABSOLUTE: 0.45 E9/L (ref 1.5–4)
LYMPHOCYTES ABSOLUTE: 0.56 E9/L (ref 1.5–4)
LYMPHOCYTES ABSOLUTE: 0.84 E9/L (ref 1.5–4)
LYMPHOCYTES RELATIVE PERCENT: 10.4 % (ref 20–42)
LYMPHOCYTES RELATIVE PERCENT: 6.9 % (ref 20–42)
LYMPHOCYTES RELATIVE PERCENT: 9.6 % (ref 20–42)
Lab: ABNORMAL
Lab: ABNORMAL
MAGNESIUM: 2.4 MG/DL (ref 1.6–2.6)
MAGNESIUM: 2.4 MG/DL (ref 1.6–2.6)
MCH RBC QN AUTO: 25.2 PG (ref 26–35)
MCH RBC QN AUTO: 25.5 PG (ref 26–35)
MCH RBC QN AUTO: 25.9 PG (ref 26–35)
MCHC RBC AUTO-ENTMCNC: 30.5 % (ref 32–34.5)
MCHC RBC AUTO-ENTMCNC: 31.1 % (ref 32–34.5)
MCHC RBC AUTO-ENTMCNC: 31.8 % (ref 32–34.5)
MCV RBC AUTO: 80.1 FL (ref 80–99.9)
MCV RBC AUTO: 82.6 FL (ref 80–99.9)
MCV RBC AUTO: 83.4 FL (ref 80–99.9)
METHB: 0.3 % (ref 0–1.5)
METHB: 0.4 % (ref 0–1.5)
MODE: ABNORMAL
MODE: ABNORMAL
MONOCYTES ABSOLUTE: 0.04 E9/L (ref 0.1–0.95)
MONOCYTES ABSOLUTE: 0.25 E9/L (ref 0.1–0.95)
MONOCYTES ABSOLUTE: 0.32 E9/L (ref 0.1–0.95)
MONOCYTES RELATIVE PERCENT: 0.9 % (ref 2–12)
MONOCYTES RELATIVE PERCENT: 2.6 % (ref 2–12)
MONOCYTES RELATIVE PERCENT: 3.5 % (ref 2–12)
MYCOPLASMA PNEUMONIAE BY PCR: NOT DETECTED
MYELOCYTE PERCENT: 1.7 % (ref 0–0)
MYELOCYTE PERCENT: 1.7 % (ref 0–0)
NEUTROPHILS ABSOLUTE: 4.01 E9/L (ref 1.8–7.3)
NEUTROPHILS ABSOLUTE: 7.2 E9/L (ref 1.8–7.3)
NEUTROPHILS ABSOLUTE: 7.39 E9/L (ref 1.8–7.3)
NEUTROPHILS RELATIVE PERCENT: 86.1 % (ref 43–80)
NEUTROPHILS RELATIVE PERCENT: 87 % (ref 43–80)
NEUTROPHILS RELATIVE PERCENT: 89.6 % (ref 43–80)
NITRITE, URINE: NEGATIVE
NUCLEATED RED BLOOD CELLS: 2.6 /100 WBC
NUCLEATED RED BLOOD CELLS: 4.3 /100 WBC
O2 SATURATION: 40.8 % (ref 92–98.5)
O2 SATURATION: 91.3 % (ref 92–98.5)
O2HB: 39.5 % (ref 94–97)
O2HB: 90.8 % (ref 94–97)
OPERATOR ID: 1115
OPERATOR ID: 1632
ORGANISM: ABNORMAL
OVALOCYTES: ABNORMAL
PARAINFLUENZA VIRUS 1 BY PCR: NOT DETECTED
PARAINFLUENZA VIRUS 2 BY PCR: NOT DETECTED
PARAINFLUENZA VIRUS 3 BY PCR: NOT DETECTED
PARAINFLUENZA VIRUS 4 BY PCR: NOT DETECTED
PATIENT TEMP: 37 C
PATIENT TEMP: 37 C
PCO2: 20.3 MMHG (ref 35–45)
PCO2: 35.4 MMHG (ref 35–45)
PDW BLD-RTO: 23.3 FL (ref 11.5–15)
PDW BLD-RTO: 23.3 FL (ref 11.5–15)
PDW BLD-RTO: 23.5 FL (ref 11.5–15)
PH BLOOD GAS: 7.3 (ref 7.35–7.45)
PH BLOOD GAS: 7.34 (ref 7.35–7.45)
PH UA: 5.5 (ref 5–9)
PHOSPHORUS: 4.5 MG/DL (ref 2.5–4.5)
PHOSPHORUS: 4.8 MG/DL (ref 2.5–4.5)
PLATELET # BLD: 111 E9/L (ref 130–450)
PLATELET # BLD: 118 E9/L (ref 130–450)
PLATELET # BLD: 123 E9/L (ref 130–450)
PMV BLD AUTO: 10 FL (ref 7–12)
PMV BLD AUTO: 10.3 FL (ref 7–12)
PMV BLD AUTO: 9.3 FL (ref 7–12)
PO2: 26 MMHG (ref 75–100)
PO2: 65.7 MMHG (ref 75–100)
POIKILOCYTES: ABNORMAL
POLYCHROMASIA: ABNORMAL
POTASSIUM REFLEX MAGNESIUM: 5.9 MMOL/L (ref 3.5–5)
POTASSIUM SERPL-SCNC: 4.4 MMOL/L (ref 3.5–5)
POTASSIUM SERPL-SCNC: 4.98 MMOL/L (ref 3.5–5)
POTASSIUM SERPL-SCNC: 5.3 MMOL/L (ref 3.5–5)
POTASSIUM SERPL-SCNC: 5.7 MMOL/L (ref 3.5–5)
POTASSIUM, UR: 31 MMOL/L
PROCALCITONIN: 0.62 NG/ML (ref 0–0.08)
PROTEIN UA: 30 MG/DL
PROTHROMBIN TIME: 18.4 SEC (ref 9.3–12.4)
RBC # BLD: 3.01 E12/L (ref 3.8–5.8)
RBC # BLD: 3.17 E12/L (ref 3.8–5.8)
RBC # BLD: 3.26 E12/L (ref 3.8–5.8)
RBC UA: ABNORMAL /HPF (ref 0–2)
RESPIRATORY SYNCYTIAL VIRUS BY PCR: NOT DETECTED
SALICYLATE, SERUM: <0.3 MG/DL (ref 0–30)
SARS-COV-2, PCR: NOT DETECTED
SCHISTOCYTES: ABNORMAL
SODIUM BLD-SCNC: 128 MMOL/L (ref 132–146)
SODIUM BLD-SCNC: 130 MMOL/L (ref 132–146)
SODIUM BLD-SCNC: 131 MMOL/L (ref 132–146)
SODIUM URINE: <20 MMOL/L
SOURCE, BLOOD GAS: ABNORMAL
SOURCE, BLOOD GAS: ABNORMAL
SPECIFIC GRAVITY UA: 1.01 (ref 1–1.03)
STREP PNEUMONIAE ANTIGEN, URINE: NORMAL
THB: 8.8 G/DL (ref 11.5–16.5)
THB: 9.1 G/DL (ref 11.5–16.5)
TIME ANALYZED: 1543
TIME ANALYZED: 657
TOTAL PROTEIN: 5.2 G/DL (ref 6.4–8.3)
TOTAL PROTEIN: 5.5 G/DL (ref 6.4–8.3)
TOTAL PROTEIN: 5.7 G/DL (ref 6.4–8.3)
TRICYCLIC ANTIDEPRESSANTS SCREEN SERUM: NEGATIVE NG/ML
TROPONIN, HIGH SENSITIVITY: 180 NG/L (ref 0–11)
TROPONIN, HIGH SENSITIVITY: 272 NG/L (ref 0–11)
TROPONIN, HIGH SENSITIVITY: 96 NG/L (ref 0–11)
URINE CULTURE, ROUTINE: ABNORMAL
UROBILINOGEN, URINE: 0.2 E.U./DL
VANCOMYCIN RANDOM: 7.1 MCG/ML (ref 5–40)
WBC # BLD: 4.5 E9/L (ref 4.5–11.5)
WBC # BLD: 8 E9/L (ref 4.5–11.5)
WBC # BLD: 8.4 E9/L (ref 4.5–11.5)
WBC UA: ABNORMAL /HPF (ref 0–5)

## 2022-01-01 PROCEDURE — 2580000003 HC RX 258: Performed by: STUDENT IN AN ORGANIZED HEALTH CARE EDUCATION/TRAINING PROGRAM

## 2022-01-01 PROCEDURE — 2700000000 HC OXYGEN THERAPY PER DAY

## 2022-01-01 PROCEDURE — 80202 ASSAY OF VANCOMYCIN: CPT

## 2022-01-01 PROCEDURE — 82436 ASSAY OF URINE CHLORIDE: CPT

## 2022-01-01 PROCEDURE — 85610 PROTHROMBIN TIME: CPT

## 2022-01-01 PROCEDURE — 84484 ASSAY OF TROPONIN QUANT: CPT

## 2022-01-01 PROCEDURE — 99285 EMERGENCY DEPT VISIT HI MDM: CPT

## 2022-01-01 PROCEDURE — 6360000004 HC RX CONTRAST MEDICATION: Performed by: RADIOLOGY

## 2022-01-01 PROCEDURE — 83605 ASSAY OF LACTIC ACID: CPT

## 2022-01-01 PROCEDURE — 83735 ASSAY OF MAGNESIUM: CPT

## 2022-01-01 PROCEDURE — 2000000000 HC ICU R&B

## 2022-01-01 PROCEDURE — 85025 COMPLETE CBC W/AUTO DIFF WBC: CPT

## 2022-01-01 PROCEDURE — 6360000002 HC RX W HCPCS: Performed by: NURSE PRACTITIONER

## 2022-01-01 PROCEDURE — 71045 X-RAY EXAM CHEST 1 VIEW: CPT

## 2022-01-01 PROCEDURE — 87040 BLOOD CULTURE FOR BACTERIA: CPT

## 2022-01-01 PROCEDURE — 93306 TTE W/DOPPLER COMPLETE: CPT

## 2022-01-01 PROCEDURE — 80053 COMPREHEN METABOLIC PANEL: CPT

## 2022-01-01 PROCEDURE — 82570 ASSAY OF URINE CREATININE: CPT

## 2022-01-01 PROCEDURE — 2580000003 HC RX 258

## 2022-01-01 PROCEDURE — 93005 ELECTROCARDIOGRAM TRACING: CPT

## 2022-01-01 PROCEDURE — 84300 ASSAY OF URINE SODIUM: CPT

## 2022-01-01 PROCEDURE — 06HM33Z INSERTION OF INFUSION DEVICE INTO RIGHT FEMORAL VEIN, PERCUTANEOUS APPROACH: ICD-10-PCS | Performed by: STUDENT IN AN ORGANIZED HEALTH CARE EDUCATION/TRAINING PROGRAM

## 2022-01-01 PROCEDURE — 82330 ASSAY OF CALCIUM: CPT

## 2022-01-01 PROCEDURE — 6360000002 HC RX W HCPCS

## 2022-01-01 PROCEDURE — 6360000002 HC RX W HCPCS: Performed by: INTERNAL MEDICINE

## 2022-01-01 PROCEDURE — 87077 CULTURE AEROBIC IDENTIFY: CPT

## 2022-01-01 PROCEDURE — 82805 BLOOD GASES W/O2 SATURATION: CPT

## 2022-01-01 PROCEDURE — 36620 INSERTION CATHETER ARTERY: CPT

## 2022-01-01 PROCEDURE — 71260 CT THORAX DX C+: CPT

## 2022-01-01 PROCEDURE — C9113 INJ PANTOPRAZOLE SODIUM, VIA: HCPCS

## 2022-01-01 PROCEDURE — 6370000000 HC RX 637 (ALT 250 FOR IP)

## 2022-01-01 PROCEDURE — 84132 ASSAY OF SERUM POTASSIUM: CPT

## 2022-01-01 PROCEDURE — 84133 ASSAY OF URINE POTASSIUM: CPT

## 2022-01-01 PROCEDURE — 99291 CRITICAL CARE FIRST HOUR: CPT | Performed by: INTERNAL MEDICINE

## 2022-01-01 PROCEDURE — 87449 NOS EACH ORGANISM AG IA: CPT

## 2022-01-01 PROCEDURE — A4216 STERILE WATER/SALINE, 10 ML: HCPCS

## 2022-01-01 PROCEDURE — 96366 THER/PROPH/DIAG IV INF ADDON: CPT

## 2022-01-01 PROCEDURE — 94664 DEMO&/EVAL PT USE INHALER: CPT

## 2022-01-01 PROCEDURE — 81001 URINALYSIS AUTO W/SCOPE: CPT

## 2022-01-01 PROCEDURE — 99222 1ST HOSP IP/OBS MODERATE 55: CPT | Performed by: NURSE PRACTITIONER

## 2022-01-01 PROCEDURE — 2500000003 HC RX 250 WO HCPCS: Performed by: STUDENT IN AN ORGANIZED HEALTH CARE EDUCATION/TRAINING PROGRAM

## 2022-01-01 PROCEDURE — 82077 ASSAY SPEC XCP UR&BREATH IA: CPT

## 2022-01-01 PROCEDURE — 74177 CT ABD & PELVIS W/CONTRAST: CPT

## 2022-01-01 PROCEDURE — 96367 TX/PROPH/DG ADDL SEQ IV INF: CPT

## 2022-01-01 PROCEDURE — 84145 PROCALCITONIN (PCT): CPT

## 2022-01-01 PROCEDURE — 96374 THER/PROPH/DIAG INJ IV PUSH: CPT

## 2022-01-01 PROCEDURE — 94640 AIRWAY INHALATION TREATMENT: CPT

## 2022-01-01 PROCEDURE — 2500000003 HC RX 250 WO HCPCS: Performed by: INTERNAL MEDICINE

## 2022-01-01 PROCEDURE — 80307 DRUG TEST PRSMV CHEM ANLYZR: CPT

## 2022-01-01 PROCEDURE — 1200000000 HC SEMI PRIVATE

## 2022-01-01 PROCEDURE — 2500000003 HC RX 250 WO HCPCS

## 2022-01-01 PROCEDURE — 84100 ASSAY OF PHOSPHORUS: CPT

## 2022-01-01 PROCEDURE — 85730 THROMBOPLASTIN TIME PARTIAL: CPT

## 2022-01-01 PROCEDURE — 2580000003 HC RX 258: Performed by: INTERNAL MEDICINE

## 2022-01-01 PROCEDURE — 80143 DRUG ASSAY ACETAMINOPHEN: CPT

## 2022-01-01 PROCEDURE — 70450 CT HEAD/BRAIN W/O DYE: CPT

## 2022-01-01 PROCEDURE — 36415 COLL VENOUS BLD VENIPUNCTURE: CPT

## 2022-01-01 PROCEDURE — 6360000002 HC RX W HCPCS: Performed by: STUDENT IN AN ORGANIZED HEALTH CARE EDUCATION/TRAINING PROGRAM

## 2022-01-01 PROCEDURE — 0202U NFCT DS 22 TRGT SARS-COV-2: CPT

## 2022-01-01 PROCEDURE — 80179 DRUG ASSAY SALICYLATE: CPT

## 2022-01-01 PROCEDURE — 82533 TOTAL CORTISOL: CPT

## 2022-01-01 PROCEDURE — 96375 TX/PRO/DX INJ NEW DRUG ADDON: CPT

## 2022-01-01 PROCEDURE — 96365 THER/PROPH/DIAG IV INF INIT: CPT

## 2022-01-01 PROCEDURE — 36556 INSERT NON-TUNNEL CV CATH: CPT

## 2022-01-01 PROCEDURE — 87088 URINE BACTERIA CULTURE: CPT

## 2022-01-01 PROCEDURE — 87186 SC STD MICRODIL/AGAR DIL: CPT

## 2022-01-01 PROCEDURE — 51702 INSERT TEMP BLADDER CATH: CPT

## 2022-01-01 RX ORDER — SODIUM CHLORIDE 0.9 % (FLUSH) 0.9 %
5-40 SYRINGE (ML) INJECTION EVERY 12 HOURS SCHEDULED
Status: DISCONTINUED | OUTPATIENT
Start: 2022-01-01 | End: 2022-01-01 | Stop reason: HOSPADM

## 2022-01-01 RX ORDER — MORPHINE SULFATE 4 MG/ML
4 INJECTION, SOLUTION INTRAMUSCULAR; INTRAVENOUS
Status: DISCONTINUED | OUTPATIENT
Start: 2022-01-01 | End: 2022-01-01 | Stop reason: HOSPADM

## 2022-01-01 RX ORDER — 0.9 % SODIUM CHLORIDE 0.9 %
1000 INTRAVENOUS SOLUTION INTRAVENOUS ONCE
Status: COMPLETED | OUTPATIENT
Start: 2022-01-01 | End: 2022-01-01

## 2022-01-01 RX ORDER — GLYCOPYRROLATE 0.2 MG/ML
0.2 INJECTION INTRAMUSCULAR; INTRAVENOUS EVERY 4 HOURS PRN
Status: DISCONTINUED | OUTPATIENT
Start: 2022-01-01 | End: 2022-01-01 | Stop reason: HOSPADM

## 2022-01-01 RX ORDER — ACETAMINOPHEN 650 MG/1
650 SUPPOSITORY RECTAL EVERY 6 HOURS PRN
Status: DISCONTINUED | OUTPATIENT
Start: 2022-01-01 | End: 2022-01-01 | Stop reason: HOSPADM

## 2022-01-01 RX ORDER — ACETAMINOPHEN 325 MG/1
650 TABLET ORAL EVERY 6 HOURS PRN
Status: DISCONTINUED | OUTPATIENT
Start: 2022-01-01 | End: 2022-01-01 | Stop reason: HOSPADM

## 2022-01-01 RX ORDER — ONDANSETRON 2 MG/ML
4 INJECTION INTRAMUSCULAR; INTRAVENOUS EVERY 6 HOURS PRN
Status: DISCONTINUED | OUTPATIENT
Start: 2022-01-01 | End: 2022-01-01 | Stop reason: HOSPADM

## 2022-01-01 RX ORDER — SODIUM CHLORIDE 9 MG/ML
INJECTION, SOLUTION INTRAVENOUS PRN
Status: DISCONTINUED | OUTPATIENT
Start: 2022-01-01 | End: 2022-01-01

## 2022-01-01 RX ORDER — HEPARIN SODIUM 10000 [USP'U]/ML
5000 INJECTION, SOLUTION INTRAVENOUS; SUBCUTANEOUS EVERY 8 HOURS
Status: DISCONTINUED | OUTPATIENT
Start: 2022-01-01 | End: 2022-01-01

## 2022-01-01 RX ORDER — MORPHINE SULFATE 2 MG/ML
2 INJECTION, SOLUTION INTRAMUSCULAR; INTRAVENOUS
Status: DISCONTINUED | OUTPATIENT
Start: 2022-01-01 | End: 2022-01-01 | Stop reason: HOSPADM

## 2022-01-01 RX ORDER — IPRATROPIUM BROMIDE AND ALBUTEROL SULFATE 2.5; .5 MG/3ML; MG/3ML
1 SOLUTION RESPIRATORY (INHALATION)
Status: DISCONTINUED | OUTPATIENT
Start: 2022-01-01 | End: 2022-01-01

## 2022-01-01 RX ORDER — SODIUM CHLORIDE 0.9 % (FLUSH) 0.9 %
5-40 SYRINGE (ML) INJECTION PRN
Status: DISCONTINUED | OUTPATIENT
Start: 2022-01-01 | End: 2022-01-01

## 2022-01-01 RX ORDER — SODIUM CHLORIDE, SODIUM LACTATE, POTASSIUM CHLORIDE, AND CALCIUM CHLORIDE .6; .31; .03; .02 G/100ML; G/100ML; G/100ML; G/100ML
1000 INJECTION, SOLUTION INTRAVENOUS ONCE
Status: COMPLETED | OUTPATIENT
Start: 2022-01-01 | End: 2022-01-01

## 2022-01-01 RX ORDER — MORPHINE SULFATE 2 MG/ML
0.5 INJECTION, SOLUTION INTRAMUSCULAR; INTRAVENOUS EVERY 4 HOURS PRN
Status: DISCONTINUED | OUTPATIENT
Start: 2022-01-01 | End: 2022-01-01 | Stop reason: HOSPADM

## 2022-01-01 RX ORDER — POLYETHYLENE GLYCOL 3350 17 G/17G
17 POWDER, FOR SOLUTION ORAL DAILY PRN
Status: DISCONTINUED | OUTPATIENT
Start: 2022-01-01 | End: 2022-01-01

## 2022-01-01 RX ORDER — SODIUM CHLORIDE 9 MG/ML
INJECTION, SOLUTION INTRAVENOUS CONTINUOUS
Status: DISCONTINUED | OUTPATIENT
Start: 2022-01-01 | End: 2022-01-01

## 2022-01-01 RX ORDER — ONDANSETRON 4 MG/1
4 TABLET, ORALLY DISINTEGRATING ORAL EVERY 8 HOURS PRN
Status: DISCONTINUED | OUTPATIENT
Start: 2022-01-01 | End: 2022-01-01 | Stop reason: HOSPADM

## 2022-01-01 RX ORDER — LORAZEPAM 2 MG/ML
2 INJECTION INTRAMUSCULAR
Status: DISCONTINUED | OUTPATIENT
Start: 2022-01-01 | End: 2022-01-01 | Stop reason: HOSPADM

## 2022-01-01 RX ORDER — LORAZEPAM 2 MG/ML
1 CONCENTRATE ORAL
Status: DISCONTINUED | OUTPATIENT
Start: 2022-01-01 | End: 2022-01-01

## 2022-01-01 RX ADMIN — SODIUM CHLORIDE: 9 INJECTION, SOLUTION INTRAVENOUS at 19:19

## 2022-01-01 RX ADMIN — SODIUM CHLORIDE 1000 ML: 9 INJECTION, SOLUTION INTRAVENOUS at 16:25

## 2022-01-01 RX ADMIN — HYDROCORTISONE SODIUM SUCCINATE 50 MG: 100 INJECTION, POWDER, FOR SOLUTION INTRAMUSCULAR; INTRAVENOUS at 09:09

## 2022-01-01 RX ADMIN — VASOPRESSIN 0.01 UNITS/MIN: 20 INJECTION INTRAVENOUS at 07:09

## 2022-01-01 RX ADMIN — IPRATROPIUM BROMIDE AND ALBUTEROL SULFATE 1 AMPULE: .5; 2.5 SOLUTION RESPIRATORY (INHALATION) at 19:11

## 2022-01-01 RX ADMIN — SODIUM CHLORIDE, PRESERVATIVE FREE 10 ML: 5 INJECTION INTRAVENOUS at 09:09

## 2022-01-01 RX ADMIN — IOPAMIDOL 75 ML: 755 INJECTION, SOLUTION INTRAVENOUS at 17:55

## 2022-01-01 RX ADMIN — SODIUM CHLORIDE: 9 INJECTION, SOLUTION INTRAVENOUS at 01:08

## 2022-01-01 RX ADMIN — IPRATROPIUM BROMIDE AND ALBUTEROL SULFATE 1 AMPULE: .5; 2.5 SOLUTION RESPIRATORY (INHALATION) at 15:29

## 2022-01-01 RX ADMIN — HYDROCORTISONE SODIUM SUCCINATE 50 MG: 100 INJECTION, POWDER, FOR SOLUTION INTRAMUSCULAR; INTRAVENOUS at 16:09

## 2022-01-01 RX ADMIN — CEFTRIAXONE 1000 MG: 1 INJECTION, POWDER, FOR SOLUTION INTRAMUSCULAR; INTRAVENOUS at 16:14

## 2022-01-01 RX ADMIN — MORPHINE SULFATE 2 MG: 2 INJECTION, SOLUTION INTRAMUSCULAR; INTRAVENOUS at 10:44

## 2022-01-01 RX ADMIN — MORPHINE SULFATE 0.5 MG: 2 INJECTION, SOLUTION INTRAMUSCULAR; INTRAVENOUS at 03:49

## 2022-01-01 RX ADMIN — HYDROCORTISONE SODIUM SUCCINATE 50 MG: 100 INJECTION, POWDER, FOR SOLUTION INTRAMUSCULAR; INTRAVENOUS at 10:12

## 2022-01-01 RX ADMIN — IPRATROPIUM BROMIDE AND ALBUTEROL SULFATE 1 AMPULE: .5; 2.5 SOLUTION RESPIRATORY (INHALATION) at 08:07

## 2022-01-01 RX ADMIN — VANCOMYCIN HYDROCHLORIDE 1000 MG: 1 INJECTION, POWDER, LYOPHILIZED, FOR SOLUTION INTRAVENOUS at 09:29

## 2022-01-01 RX ADMIN — HYDROCORTISONE SODIUM SUCCINATE 100 MG: 100 INJECTION, POWDER, FOR SOLUTION INTRAMUSCULAR; INTRAVENOUS at 19:15

## 2022-01-01 RX ADMIN — CEFEPIME 1000 MG: 1 INJECTION, POWDER, FOR SOLUTION INTRAMUSCULAR; INTRAVENOUS at 06:30

## 2022-01-01 RX ADMIN — DOXYCYCLINE 100 MG: 100 INJECTION, POWDER, LYOPHILIZED, FOR SOLUTION INTRAVENOUS at 16:24

## 2022-01-01 RX ADMIN — CEFEPIME 1000 MG: 1 INJECTION, POWDER, FOR SOLUTION INTRAMUSCULAR; INTRAVENOUS at 10:16

## 2022-01-01 RX ADMIN — SODIUM CHLORIDE, PRESERVATIVE FREE 40 MG: 5 INJECTION INTRAVENOUS at 10:11

## 2022-01-01 RX ADMIN — SODIUM CHLORIDE 1000 ML: 9 INJECTION, SOLUTION INTRAVENOUS at 15:49

## 2022-01-01 RX ADMIN — IPRATROPIUM BROMIDE AND ALBUTEROL SULFATE 1 AMPULE: .5; 2.5 SOLUTION RESPIRATORY (INHALATION) at 12:23

## 2022-01-01 RX ADMIN — GLYCOPYRROLATE 0.2 MG: 0.2 INJECTION, SOLUTION INTRAMUSCULAR; INTRAVENOUS at 11:07

## 2022-01-01 RX ADMIN — Medication 56 MCG/MIN: at 00:50

## 2022-01-01 RX ADMIN — ONDANSETRON 4 MG: 2 INJECTION INTRAMUSCULAR; INTRAVENOUS at 03:47

## 2022-01-01 RX ADMIN — Medication 56 MCG/MIN: at 14:22

## 2022-01-01 RX ADMIN — HYDROCORTISONE SODIUM SUCCINATE 50 MG: 100 INJECTION, POWDER, FOR SOLUTION INTRAMUSCULAR; INTRAVENOUS at 00:02

## 2022-01-01 RX ADMIN — MORPHINE SULFATE 0.5 MG: 2 INJECTION, SOLUTION INTRAMUSCULAR; INTRAVENOUS at 14:24

## 2022-01-01 RX ADMIN — LORAZEPAM 2 MG: 2 INJECTION INTRAMUSCULAR; INTRAVENOUS at 11:07

## 2022-01-01 RX ADMIN — HEPARIN SODIUM 5000 UNITS: 10000 INJECTION INTRAVENOUS; SUBCUTANEOUS at 09:20

## 2022-01-01 RX ADMIN — CEFEPIME 1000 MG: 1 INJECTION, POWDER, FOR SOLUTION INTRAMUSCULAR; INTRAVENOUS at 18:20

## 2022-01-01 RX ADMIN — Medication 55 MCG/MIN: at 07:37

## 2022-01-01 RX ADMIN — HEPARIN SODIUM 5000 UNITS: 10000 INJECTION INTRAVENOUS; SUBCUTANEOUS at 00:02

## 2022-01-01 RX ADMIN — MORPHINE SULFATE 2 MG: 2 INJECTION, SOLUTION INTRAMUSCULAR; INTRAVENOUS at 15:51

## 2022-01-01 RX ADMIN — MORPHINE SULFATE 0.5 MG: 2 INJECTION, SOLUTION INTRAMUSCULAR; INTRAVENOUS at 09:10

## 2022-01-01 RX ADMIN — HEPARIN SODIUM 5000 UNITS: 10000 INJECTION INTRAVENOUS; SUBCUTANEOUS at 10:17

## 2022-01-01 RX ADMIN — IPRATROPIUM BROMIDE AND ALBUTEROL SULFATE 1 AMPULE: .5; 2.5 SOLUTION RESPIRATORY (INHALATION) at 07:47

## 2022-01-01 RX ADMIN — Medication 35 MCG/MIN: at 23:44

## 2022-01-01 RX ADMIN — SODIUM CHLORIDE, PRESERVATIVE FREE 10 ML: 5 INJECTION INTRAVENOUS at 10:12

## 2022-01-01 RX ADMIN — HEPARIN SODIUM 5000 UNITS: 10000 INJECTION INTRAVENOUS; SUBCUTANEOUS at 16:30

## 2022-01-01 RX ADMIN — Medication 5 MCG/MIN: at 18:41

## 2022-01-01 RX ADMIN — SODIUM CHLORIDE, PRESERVATIVE FREE 10 ML: 5 INJECTION INTRAVENOUS at 22:33

## 2022-01-01 RX ADMIN — SODIUM CHLORIDE, POTASSIUM CHLORIDE, SODIUM LACTATE AND CALCIUM CHLORIDE 1000 ML: 600; 310; 30; 20 INJECTION, SOLUTION INTRAVENOUS at 09:27

## 2022-01-01 RX ADMIN — SODIUM CHLORIDE: 9 INJECTION, SOLUTION INTRAVENOUS at 11:22

## 2022-01-01 RX ADMIN — Medication 56 MCG/MIN: at 19:57

## 2022-01-01 RX ADMIN — SODIUM CHLORIDE, PRESERVATIVE FREE 40 MG: 5 INJECTION INTRAVENOUS at 09:09

## 2022-01-01 ASSESSMENT — PAIN SCALES - GENERAL
PAINLEVEL_OUTOF10: 0
PAINLEVEL_OUTOF10: 4
PAINLEVEL_OUTOF10: 0
PAINLEVEL_OUTOF10: 4
PAINLEVEL_OUTOF10: 0

## 2022-01-01 ASSESSMENT — PAIN DESCRIPTION - DESCRIPTORS: DESCRIPTORS: ACHING

## 2022-01-01 ASSESSMENT — PAIN DESCRIPTION - LOCATION: LOCATION: BACK

## 2022-01-01 ASSESSMENT — PAIN DESCRIPTION - ORIENTATION: ORIENTATION: LOWER

## 2022-01-01 ASSESSMENT — PAIN - FUNCTIONAL ASSESSMENT: PAIN_FUNCTIONAL_ASSESSMENT: NONE - DENIES PAIN

## 2022-01-06 ENCOUNTER — TELEPHONE (OUTPATIENT)
Dept: FAMILY MEDICINE CLINIC | Age: 87
End: 2022-01-06

## 2022-01-06 NOTE — TELEPHONE ENCOUNTER
Left message on the number that was provided for patient to contact office to schedule with a new PCP

## 2022-01-06 NOTE — TELEPHONE ENCOUNTER
----- Message from Dharmesh Jacob sent at 12/29/2021  3:40 PM EST -----  Subject: Appointment Request    Reason for Call: Routine (Patient Request) No Script    QUESTIONS  Type of Appointment? Established Patient  Reason for appointment request? Available appointments did not meet   patient need  Additional Information for Provider? sick for two weeks, has not eaten no   appetite, he can drink fluids, starting to eat now, would like to get   checked out   ---------------------------------------------------------------------------  --------------  CALL BACK INFO  What is the best way for the office to contact you? OK to leave message on   voicemail  Preferred Call Back Phone Number? [phone]  ---------------------------------------------------------------------------  --------------  SCRIPT ANSWERS  Relationship to Patient? Other  Representative Name? Haile Avila  Additional information verified (besides Name and Date of Birth)? Address  (Is the patient requesting to see the provider for a procedure?)? No  (Is the patient requesting to see the provider urgently  today or   tomorrow. )? No  Have you been diagnosed with, awaiting test results for, or told that you   are suspected of having COVID-19 (Coronavirus)? (If patient has tested   negative or was tested as a requirement for work, school, or travel and   not based on symptoms, answer no)? No  Within the past two weeks have you developed any of the following symptoms   (answer no if symptoms have been present longer than 2 weeks or began   more than 2 weeks ago)? Fever or Chills, Cough, Shortness of breath or   difficulty breathing, Loss of taste or smell, Sore throat, Nasal   congestion, Sneezing or runny nose, Fatigue or generalized body aches   (answer no if pain is specific to a body part e.g. back pain), Diarrhea,   Headache? No  Have you had close contact with someone with COVID-19 in the last 14 days?    No  (Service Expert  click yes below to proceed with [employer] As Usual   Scheduling)?  Yes

## 2022-08-18 ENCOUNTER — HOSPITAL ENCOUNTER (INPATIENT)
Age: 87
LOS: 3 days | Discharge: HOME | DRG: 690 | End: 2022-08-21
Attending: EMERGENCY MEDICINE | Admitting: FAMILY MEDICINE
Payer: MEDICARE

## 2022-08-18 DIAGNOSIS — N39.0 URINARY TRACT INFECTION WITHOUT HEMATURIA, SITE UNSPECIFIED: ICD-10-CM

## 2022-08-18 DIAGNOSIS — I10 ESSENTIAL HYPERTENSION: ICD-10-CM

## 2022-08-18 DIAGNOSIS — R39.11 URINARY HESITANCY: ICD-10-CM

## 2022-08-18 DIAGNOSIS — E87.1 HYPONATREMIA: Primary | ICD-10-CM

## 2022-08-18 LAB
ANION GAP SERPL CALCULATED.3IONS-SCNC: 17 MMOL/L (ref 7–16)
BACTERIA: ABNORMAL /HPF
BASOPHILS ABSOLUTE: 0.04 E9/L (ref 0–0.2)
BASOPHILS RELATIVE PERCENT: 0.4 % (ref 0–2)
BILIRUBIN URINE: NEGATIVE
BLOOD, URINE: ABNORMAL
BUN BLDV-MCNC: 14 MG/DL (ref 6–23)
CALCIUM SERPL-MCNC: 8.8 MG/DL (ref 8.6–10.2)
CHLORIDE BLD-SCNC: 90 MMOL/L (ref 98–107)
CLARITY: ABNORMAL
CO2: 15 MMOL/L (ref 22–29)
COLOR: YELLOW
CREAT SERPL-MCNC: 1.2 MG/DL (ref 0.7–1.2)
EOSINOPHILS ABSOLUTE: 0.01 E9/L (ref 0.05–0.5)
EOSINOPHILS RELATIVE PERCENT: 0.1 % (ref 0–6)
GFR AFRICAN AMERICAN: >60
GFR NON-AFRICAN AMERICAN: 57 ML/MIN/1.73
GLUCOSE BLD-MCNC: 124 MG/DL (ref 74–99)
GLUCOSE URINE: NEGATIVE MG/DL
HCT VFR BLD CALC: 30.9 % (ref 37–54)
HEMOGLOBIN: 10 G/DL (ref 12.5–16.5)
IMMATURE GRANULOCYTES #: 0.19 E9/L
IMMATURE GRANULOCYTES %: 1.8 % (ref 0–5)
KETONES, URINE: NEGATIVE MG/DL
LEUKOCYTE ESTERASE, URINE: ABNORMAL
LYMPHOCYTES ABSOLUTE: 1.1 E9/L (ref 1.5–4)
LYMPHOCYTES RELATIVE PERCENT: 10.4 % (ref 20–42)
MCH RBC QN AUTO: 26.2 PG (ref 26–35)
MCHC RBC AUTO-ENTMCNC: 32.4 % (ref 32–34.5)
MCV RBC AUTO: 81.1 FL (ref 80–99.9)
MONOCYTES ABSOLUTE: 0.58 E9/L (ref 0.1–0.95)
MONOCYTES RELATIVE PERCENT: 5.5 % (ref 2–12)
NEUTROPHILS ABSOLUTE: 8.66 E9/L (ref 1.8–7.3)
NEUTROPHILS RELATIVE PERCENT: 81.8 % (ref 43–80)
NITRITE, URINE: NEGATIVE
PDW BLD-RTO: 15.5 FL (ref 11.5–15)
PH UA: 6 (ref 5–9)
PLATELET # BLD: 215 E9/L (ref 130–450)
PMV BLD AUTO: 10.6 FL (ref 7–12)
POTASSIUM REFLEX MAGNESIUM: 4.2 MMOL/L (ref 3.5–5)
PROTEIN UA: NEGATIVE MG/DL
RBC # BLD: 3.81 E12/L (ref 3.8–5.8)
RBC UA: ABNORMAL /HPF (ref 0–2)
SODIUM BLD-SCNC: 122 MMOL/L (ref 132–146)
SPECIFIC GRAVITY UA: <=1.005 (ref 1–1.03)
UROBILINOGEN, URINE: 0.2 E.U./DL
WBC # BLD: 10.6 E9/L (ref 4.5–11.5)
WBC UA: ABNORMAL /HPF (ref 0–5)

## 2022-08-18 PROCEDURE — 85025 COMPLETE CBC W/AUTO DIFF WBC: CPT

## 2022-08-18 PROCEDURE — 83935 ASSAY OF URINE OSMOLALITY: CPT

## 2022-08-18 PROCEDURE — 87088 URINE BACTERIA CULTURE: CPT

## 2022-08-18 PROCEDURE — 84300 ASSAY OF URINE SODIUM: CPT

## 2022-08-18 PROCEDURE — 87186 SC STD MICRODIL/AGAR DIL: CPT

## 2022-08-18 PROCEDURE — 82570 ASSAY OF URINE CREATININE: CPT

## 2022-08-18 PROCEDURE — 2140000000 HC CCU INTERMEDIATE R&B

## 2022-08-18 PROCEDURE — 80048 BASIC METABOLIC PNL TOTAL CA: CPT

## 2022-08-18 PROCEDURE — 82436 ASSAY OF URINE CHLORIDE: CPT

## 2022-08-18 PROCEDURE — 83605 ASSAY OF LACTIC ACID: CPT

## 2022-08-18 PROCEDURE — 87077 CULTURE AEROBIC IDENTIFY: CPT

## 2022-08-18 PROCEDURE — 96374 THER/PROPH/DIAG INJ IV PUSH: CPT

## 2022-08-18 PROCEDURE — 84133 ASSAY OF URINE POTASSIUM: CPT

## 2022-08-18 PROCEDURE — 6360000002 HC RX W HCPCS: Performed by: EMERGENCY MEDICINE

## 2022-08-18 PROCEDURE — 2580000003 HC RX 258: Performed by: EMERGENCY MEDICINE

## 2022-08-18 PROCEDURE — 84156 ASSAY OF PROTEIN URINE: CPT

## 2022-08-18 PROCEDURE — 83930 ASSAY OF BLOOD OSMOLALITY: CPT

## 2022-08-18 PROCEDURE — 81001 URINALYSIS AUTO W/SCOPE: CPT

## 2022-08-18 PROCEDURE — 99285 EMERGENCY DEPT VISIT HI MDM: CPT

## 2022-08-18 RX ORDER — ONDANSETRON 2 MG/ML
4 INJECTION INTRAMUSCULAR; INTRAVENOUS ONCE
Status: COMPLETED | OUTPATIENT
Start: 2022-08-18 | End: 2022-08-18

## 2022-08-18 RX ADMIN — ONDANSETRON HYDROCHLORIDE 4 MG: 2 SOLUTION INTRAMUSCULAR; INTRAVENOUS at 22:34

## 2022-08-18 RX ADMIN — CEFTRIAXONE 1000 MG: 1 INJECTION, POWDER, FOR SOLUTION INTRAMUSCULAR; INTRAVENOUS at 23:42

## 2022-08-19 LAB
ALBUMIN SERPL-MCNC: 3.2 G/DL (ref 3.5–5.2)
ALP BLD-CCNC: 276 U/L (ref 40–129)
ALT SERPL-CCNC: 5 U/L (ref 0–40)
ANION GAP SERPL CALCULATED.3IONS-SCNC: 10 MMOL/L (ref 7–16)
ANION GAP SERPL CALCULATED.3IONS-SCNC: 13 MMOL/L (ref 7–16)
ANION GAP SERPL CALCULATED.3IONS-SCNC: 13 MMOL/L (ref 7–16)
AST SERPL-CCNC: 16 U/L (ref 0–39)
BASOPHILS ABSOLUTE: 0.02 E9/L (ref 0–0.2)
BASOPHILS RELATIVE PERCENT: 0.3 % (ref 0–2)
BILIRUB SERPL-MCNC: 0.5 MG/DL (ref 0–1.2)
BUN BLDV-MCNC: 13 MG/DL (ref 6–23)
BUN BLDV-MCNC: 13 MG/DL (ref 6–23)
BUN BLDV-MCNC: 14 MG/DL (ref 6–23)
CALCIUM SERPL-MCNC: 8.1 MG/DL (ref 8.6–10.2)
CALCIUM SERPL-MCNC: 8.4 MG/DL (ref 8.6–10.2)
CALCIUM SERPL-MCNC: 8.6 MG/DL (ref 8.6–10.2)
CHLORIDE BLD-SCNC: 100 MMOL/L (ref 98–107)
CHLORIDE BLD-SCNC: 100 MMOL/L (ref 98–107)
CHLORIDE BLD-SCNC: 104 MMOL/L (ref 98–107)
CHLORIDE URINE RANDOM: 26 MMOL/L
CO2: 19 MMOL/L (ref 22–29)
CO2: 20 MMOL/L (ref 22–29)
CO2: 20 MMOL/L (ref 22–29)
CREAT SERPL-MCNC: 1 MG/DL (ref 0.7–1.2)
CREAT SERPL-MCNC: 1 MG/DL (ref 0.7–1.2)
CREAT SERPL-MCNC: 1.2 MG/DL (ref 0.7–1.2)
CREATININE URINE: 39 MG/DL (ref 40–278)
EOSINOPHILS ABSOLUTE: 0.01 E9/L (ref 0.05–0.5)
EOSINOPHILS RELATIVE PERCENT: 0.1 % (ref 0–6)
GFR AFRICAN AMERICAN: >60
GFR NON-AFRICAN AMERICAN: 57 ML/MIN/1.73
GFR NON-AFRICAN AMERICAN: >60 ML/MIN/1.73
GFR NON-AFRICAN AMERICAN: >60 ML/MIN/1.73
GLUCOSE BLD-MCNC: 115 MG/DL (ref 74–99)
GLUCOSE BLD-MCNC: 92 MG/DL (ref 74–99)
GLUCOSE BLD-MCNC: 94 MG/DL (ref 74–99)
HCT VFR BLD CALC: 29.4 % (ref 37–54)
HEMOGLOBIN: 9.3 G/DL (ref 12.5–16.5)
IMMATURE GRANULOCYTES #: 0.09 E9/L
IMMATURE GRANULOCYTES %: 1.1 % (ref 0–5)
LACTIC ACID: 1 MMOL/L (ref 0.5–2.2)
LYMPHOCYTES ABSOLUTE: 1.61 E9/L (ref 1.5–4)
LYMPHOCYTES RELATIVE PERCENT: 20.2 % (ref 20–42)
MCH RBC QN AUTO: 25.3 PG (ref 26–35)
MCHC RBC AUTO-ENTMCNC: 31.6 % (ref 32–34.5)
MCV RBC AUTO: 80.1 FL (ref 80–99.9)
MONOCYTES ABSOLUTE: 0.52 E9/L (ref 0.1–0.95)
MONOCYTES RELATIVE PERCENT: 6.5 % (ref 2–12)
NEUTROPHILS ABSOLUTE: 5.71 E9/L (ref 1.8–7.3)
NEUTROPHILS RELATIVE PERCENT: 71.8 % (ref 43–80)
OSMOLALITY URINE: 159 MOSM/KG (ref 300–900)
OSMOLALITY: 259 MOSM/KG (ref 285–310)
PDW BLD-RTO: 15.8 FL (ref 11.5–15)
PLATELET # BLD: 202 E9/L (ref 130–450)
PMV BLD AUTO: 10.3 FL (ref 7–12)
POTASSIUM REFLEX MAGNESIUM: 4.2 MMOL/L (ref 3.5–5)
POTASSIUM SERPL-SCNC: 3.6 MMOL/L (ref 3.5–5)
POTASSIUM SERPL-SCNC: 3.9 MMOL/L (ref 3.5–5)
POTASSIUM, UR: 20.2 MMOL/L
PROTEIN PROTEIN: 13 MG/DL (ref 0–12)
PROTEIN/CREAT RATIO: 0.3
PROTEIN/CREAT RATIO: 0.3 (ref 0–0.2)
RBC # BLD: 3.67 E12/L (ref 3.8–5.8)
SODIUM BLD-SCNC: 130 MMOL/L (ref 132–146)
SODIUM BLD-SCNC: 132 MMOL/L (ref 132–146)
SODIUM BLD-SCNC: 137 MMOL/L (ref 132–146)
SODIUM URINE: 24 MMOL/L
TOTAL PROTEIN: 6 G/DL (ref 6.4–8.3)
WBC # BLD: 8 E9/L (ref 4.5–11.5)

## 2022-08-19 PROCEDURE — 85025 COMPLETE CBC W/AUTO DIFF WBC: CPT

## 2022-08-19 PROCEDURE — 80048 BASIC METABOLIC PNL TOTAL CA: CPT

## 2022-08-19 PROCEDURE — 6360000002 HC RX W HCPCS: Performed by: FAMILY MEDICINE

## 2022-08-19 PROCEDURE — 2140000000 HC CCU INTERMEDIATE R&B

## 2022-08-19 PROCEDURE — 2580000003 HC RX 258: Performed by: FAMILY MEDICINE

## 2022-08-19 PROCEDURE — 6370000000 HC RX 637 (ALT 250 FOR IP): Performed by: FAMILY MEDICINE

## 2022-08-19 PROCEDURE — 80053 COMPREHEN METABOLIC PANEL: CPT

## 2022-08-19 PROCEDURE — 36415 COLL VENOUS BLD VENIPUNCTURE: CPT

## 2022-08-19 PROCEDURE — 2580000003 HC RX 258: Performed by: STUDENT IN AN ORGANIZED HEALTH CARE EDUCATION/TRAINING PROGRAM

## 2022-08-19 PROCEDURE — 2580000003 HC RX 258: Performed by: EMERGENCY MEDICINE

## 2022-08-19 RX ORDER — CELECOXIB 100 MG/1
100 CAPSULE ORAL 2 TIMES DAILY
Status: DISCONTINUED | OUTPATIENT
Start: 2022-08-19 | End: 2022-08-19

## 2022-08-19 RX ORDER — POLYETHYLENE GLYCOL 3350 17 G/17G
17 POWDER, FOR SOLUTION ORAL DAILY PRN
Status: DISCONTINUED | OUTPATIENT
Start: 2022-08-19 | End: 2022-08-21 | Stop reason: HOSPADM

## 2022-08-19 RX ORDER — LISINOPRIL 20 MG/1
20 TABLET ORAL DAILY
Status: DISCONTINUED | OUTPATIENT
Start: 2022-08-19 | End: 2022-08-21 | Stop reason: HOSPADM

## 2022-08-19 RX ORDER — ACETAMINOPHEN 325 MG/1
650 TABLET ORAL EVERY 6 HOURS PRN
Status: DISCONTINUED | OUTPATIENT
Start: 2022-08-19 | End: 2022-08-21 | Stop reason: HOSPADM

## 2022-08-19 RX ORDER — PROMETHAZINE HYDROCHLORIDE 12.5 MG/1
12.5 TABLET ORAL EVERY 6 HOURS PRN
Status: DISCONTINUED | OUTPATIENT
Start: 2022-08-19 | End: 2022-08-21 | Stop reason: HOSPADM

## 2022-08-19 RX ORDER — HYDROCHLOROTHIAZIDE 12.5 MG/1
12.5 TABLET ORAL DAILY
Status: DISCONTINUED | OUTPATIENT
Start: 2022-08-19 | End: 2022-08-19

## 2022-08-19 RX ORDER — SODIUM CHLORIDE 0.9 % (FLUSH) 0.9 %
10 SYRINGE (ML) INJECTION EVERY 12 HOURS SCHEDULED
Status: DISCONTINUED | OUTPATIENT
Start: 2022-08-19 | End: 2022-08-21 | Stop reason: HOSPADM

## 2022-08-19 RX ORDER — SODIUM CHLORIDE 9 MG/ML
INJECTION, SOLUTION INTRAVENOUS PRN
Status: DISCONTINUED | OUTPATIENT
Start: 2022-08-19 | End: 2022-08-21 | Stop reason: HOSPADM

## 2022-08-19 RX ORDER — LISINOPRIL AND HYDROCHLOROTHIAZIDE 20; 12.5 MG/1; MG/1
1 TABLET ORAL DAILY
Status: CANCELLED | OUTPATIENT
Start: 2022-08-19

## 2022-08-19 RX ORDER — ASPIRIN 81 MG/1
81 TABLET ORAL EVERY OTHER DAY
Status: DISCONTINUED | OUTPATIENT
Start: 2022-08-19 | End: 2022-08-21 | Stop reason: HOSPADM

## 2022-08-19 RX ORDER — SODIUM CHLORIDE 0.9 % (FLUSH) 0.9 %
10 SYRINGE (ML) INJECTION PRN
Status: DISCONTINUED | OUTPATIENT
Start: 2022-08-19 | End: 2022-08-21 | Stop reason: HOSPADM

## 2022-08-19 RX ORDER — TAMSULOSIN HYDROCHLORIDE 0.4 MG/1
0.4 CAPSULE ORAL DAILY
Status: DISCONTINUED | OUTPATIENT
Start: 2022-08-19 | End: 2022-08-21 | Stop reason: HOSPADM

## 2022-08-19 RX ORDER — LISINOPRIL AND HYDROCHLOROTHIAZIDE 20; 12.5 MG/1; MG/1
1 TABLET ORAL DAILY
Status: DISCONTINUED | OUTPATIENT
Start: 2022-08-19 | End: 2022-08-19 | Stop reason: ALTCHOICE

## 2022-08-19 RX ORDER — ONDANSETRON 2 MG/ML
4 INJECTION INTRAMUSCULAR; INTRAVENOUS EVERY 6 HOURS PRN
Status: DISCONTINUED | OUTPATIENT
Start: 2022-08-19 | End: 2022-08-21 | Stop reason: HOSPADM

## 2022-08-19 RX ORDER — SODIUM CHLORIDE 9 MG/ML
INJECTION, SOLUTION INTRAVENOUS CONTINUOUS
Status: DISCONTINUED | OUTPATIENT
Start: 2022-08-19 | End: 2022-08-19

## 2022-08-19 RX ORDER — SODIUM CHLORIDE 9 MG/ML
INJECTION, SOLUTION INTRAVENOUS CONTINUOUS
Status: DISCONTINUED | OUTPATIENT
Start: 2022-08-19 | End: 2022-08-19 | Stop reason: ALTCHOICE

## 2022-08-19 RX ORDER — DEXTROSE MONOHYDRATE 50 MG/ML
INJECTION, SOLUTION INTRAVENOUS CONTINUOUS
Status: DISCONTINUED | OUTPATIENT
Start: 2022-08-19 | End: 2022-08-21 | Stop reason: HOSPADM

## 2022-08-19 RX ORDER — ENOXAPARIN SODIUM 100 MG/ML
40 INJECTION SUBCUTANEOUS DAILY
Status: DISCONTINUED | OUTPATIENT
Start: 2022-08-19 | End: 2022-08-21 | Stop reason: HOSPADM

## 2022-08-19 RX ORDER — ACETAMINOPHEN 650 MG/1
650 SUPPOSITORY RECTAL EVERY 6 HOURS PRN
Status: DISCONTINUED | OUTPATIENT
Start: 2022-08-19 | End: 2022-08-21 | Stop reason: HOSPADM

## 2022-08-19 RX ADMIN — DEXTROSE MONOHYDRATE: 50 INJECTION, SOLUTION INTRAVENOUS at 09:30

## 2022-08-19 RX ADMIN — CEFTRIAXONE 1000 MG: 1 INJECTION, POWDER, FOR SOLUTION INTRAMUSCULAR; INTRAVENOUS at 22:08

## 2022-08-19 RX ADMIN — LISINOPRIL 20 MG: 20 TABLET ORAL at 09:08

## 2022-08-19 RX ADMIN — TAMSULOSIN HYDROCHLORIDE 0.4 MG: 0.4 CAPSULE ORAL at 09:08

## 2022-08-19 RX ADMIN — ASPIRIN 81 MG: 81 TABLET, COATED ORAL at 09:08

## 2022-08-19 RX ADMIN — SODIUM CHLORIDE: 9 INJECTION, SOLUTION INTRAVENOUS at 03:59

## 2022-08-19 RX ADMIN — HYDROCHLOROTHIAZIDE 12.5 MG: 12.5 TABLET ORAL at 09:08

## 2022-08-19 RX ADMIN — SODIUM CHLORIDE: 9 INJECTION, SOLUTION INTRAVENOUS at 00:33

## 2022-08-19 RX ADMIN — SODIUM CHLORIDE, PRESERVATIVE FREE 10 ML: 5 INJECTION INTRAVENOUS at 09:08

## 2022-08-19 RX ADMIN — ENOXAPARIN SODIUM 40 MG: 100 INJECTION SUBCUTANEOUS at 09:08

## 2022-08-19 RX ADMIN — CELECOXIB 100 MG: 100 CAPSULE ORAL at 09:08

## 2022-08-19 ASSESSMENT — PAIN SCALES - GENERAL: PAINLEVEL_OUTOF10: 0

## 2022-08-19 NOTE — H&P
Hospitalist History & Physical      PCP: Elma Lindsey, APRN - CNP    Date of Service: Pt seen/examined on 8/19/2022     Chief Complaint:  had concerns including Other ((Unable to urinate)x3days per family. ). History Of Present Illness:    Mr. Rebecca Toledo, a 80y.o. year old male  who  has a past medical history of CAD (coronary artery disease), Chest pain, Chronic back pain, Hyperlipidemia, Hypertension, Industrial accident, Lump, MI (myocardial infarction) ([address] MI (myocardial infarction) ([address] Seizure disorder ([address] Status post insertion of iliac artery stent, Syncope, and Tinnitus of both ears. Rebecca Toledo is a 80 y.o. male presenting to the ED for difficulty urinating, beginning 3 days ago. The complaint has been persistent, moderate in severity, and worsened by nothing. Per EMS and the patient's family or the patient has been having difficulty urinating for the last 3 days. The patient has been dribbling and not fully emptying his bladder. He has had increasing suprapubic pain. Due to his difficulty urinating he came to the ED to be evaluated. No fevers or chills. No nausea vomiting or diarrhea. HPI limited by patient's history of dementia. Vital signs within normal limits and stable. Laboratory studies demonstrate sodium 122, anion gap 17, glucose 124, hemoglobin 10.0. Urinalysis suggestive of infection. Patient given ceftriaxone. Nephrology consulted. Medicine consulted for admission. Past Medical History:   Diagnosis Date    CAD (coronary artery disease)     Chest pain 4/2002    Hospitalized at [facility]. Chronic back pain     Hyperlipidemia     Hypertension 1987    Industrial accident 1977    (Back, neck and head). Lump     Removed from elbow.     MI (myocardial infarction) ([address]     Anterior apical.    MI (myocardial infarction) ([address] 2/10/1999    Acute inferior wall MI.    Seizure disorder ([address]     Status post insertion of iliac artery stent 11/1/82005 Syncope 9/1998    Tinnitus of both ears 2/13/2018       Past Surgical History:   Procedure Laterality Date    APPENDECTOMY      CORONARY ARTERY BYPASS GRAFT  5/3/1991    [address]:  AC - SVT to RCA, LIMA to LAD. DIAGNOSTIC CARDIAC CATH LAB PROCEDURE  4/9/1991    [address].  CAD. DIAGNOSTIC CARDIAC CATH LAB PROCEDURE  2/10/1999    [address] DIAGNOSTIC CARDIAC CATH LAB PROCEDURE  11/18/2005    NS: Dr. De León Hina CATH LAB PROCEDURE  2/7/2006    With abdominal aortogram.    DIAGNOSTIC CARDIAC CATH LAB PROCEDURE  2/11/2009    [facility]. LUMBAR DISCECTOMY      THROMBECTOMY  11/18/2005    Exploration/thrombectomy of right common femoral and right popliteal arteries. Prior to Admission medications    Medication Sig Start Date End Date Taking? Authorizing Provider   tamsulosin (FLOMAX) 0.4 MG capsule Take 1 capsule by mouth daily 9/4/19   AMERICO Patel CNP   lisinopril-hydrochlorothiazide [facility]) 20-12.5 MG per tablet Take 1 tablet by mouth daily 6/3/19   AMERICO Paetl CNP   celecoxib (CELEBREX) 100 MG capsule Take 1 capsule by mouth 2 times daily 6/3/19   AMERICO Patel CNP   aspirin 81 MG EC tablet Take 1 tablet by mouth every other day 5/15/19   AMERICO Patel CNP         Allergies:  Patient has no known allergies. Social History:    TOBACCO:   reports that he has never smoked. He has never used smokeless tobacco.  ETOH:   reports no history of alcohol use. Family History:    Reviewed in detail and negative for DM, CAD, Cancer, CVA. Positive as follows\"      Problem Relation Age of Onset    Heart Disease Mother        REVIEW OF SYSTEMS:   Pertinent positives as noted in the HPI. All other systems reviewed and negative.     PHYSICAL EXAM:  /75   Pulse 87   Temp 98.5 °F (36.9 °C)   Resp 16   Ht 6' (1.829 m)   Wt 210 lb (95.3 kg)   SpO2 98%   BMI 28.48 kg/m²   General appearance: No apparent distress, appears stated age and cooperative. HEENT: Normal cephalic, atraumatic without obvious deformity. Pupils equal, round, and reactive to light. Extra ocular muscles intact. Conjunctivae/corneas clear. Neck: Supple, with full range of motion. No jugular venous distention. Trachea midline. Respiratory: CTA  Cardiovascular: RRR  Abdomen: S/NT/ND, suprapubic tenderness  Musculoskeletal: No clubbing, cyanosis, edema of bilateral lower extremities. Brisk capillary refill. Skin: Normal skin color. No rashes or lesions. Neurologic:  Neurovascularly intact without any focal sensory/motor deficits. Cranial nerves: II-XII intact, grossly non-focal.  Psychiatric: Alert and oriented, thought content appropriate, normal insight    Reviewed EKG and CXR personally      CBC:   Recent Labs     08/18/22 2209   WBC 10.6   RBC 3.81   HGB 10.0*   HCT 30.9*   MCV 81.1   RDW 15.5*        BMP:   Recent Labs     08/18/22 2209   *   K 4.2   CL 90*   CO2 15*   BUN 14   CREATININE 1.2     LFT:  No results for input(s): PROT, ALB, ALKPHOS, ALT, AST, BILITOT, AMYLASE, LIPASE in the last 72 hours. CE:  No results for input(s): Jonatan Shackle in the last 72 hours. PT/INR: No results for input(s): INR, APTT in the last 72 hours. BNP: No results for input(s): BNP in the last 72 hours.   ESR:   Lab Results   Component Value Date    SEDRATE 20 (H) 08/13/2015     CRP: No results found for: CRP  D Dimer: No results found for: DDIMER   Folate and B12:   Lab Results   Component Value Date    [id number] 429 11/06/2015   ,   Lab Results   Component Value Date    FOLATE 11.9 11/06/2015     Lactic Acid:   Lab Results   Component Value Date    LACTA 1.6 04/14/2019     Thyroid Studies:   Lab Results   Component Value Date    TSH 1.210 09/04/2019       Oupatient labs:  Lab Results   Component Value Date    CHOL 163 05/15/2019    TRIG 82 05/15/2019    HDL 59 05/15/2019    LDLCALC 88 05/15/2019    TSH 1.210 09/04/2019    PSA 12.68 (H) 06/26/2020    INR 1.1 04/14/2019    LABA1C 5.8 11/06/2015       Urinalysis:    Lab Results   Component Value Date/Time    NITRU Negative 08/18/2022 10:09 PM    WBCUA 10-20 08/18/2022 10:09 PM    BACTERIA RARE 08/18/2022 10:09 PM    RBCUA 2-5 08/18/2022 10:09 PM    BLOODU LARGE 08/18/2022 10:09 PM    SPECGRAV <=1.005 08/18/2022 10:09 PM    GLUCOSEU Negative 08/18/2022 10:09 PM       Imaging:  No results found. ASSESSMENT:  -Hyponatremia  -Urinary tract infection  -Urinary retention  -Coronary artery disease  -Hypertension      PLAN:  -Admit to medicine  -Consult nephrology  -Monitor serum electrolytes  -Normal saline 75 mL/h  -Ceftriaxone 1 g daily  -Urine culture  -Continue home medications        Diet: No diet orders on file  Code Status: Prior  Surrogate decision maker confirmed with patient:   Extended Emergency Contact Information  Primary Emergency Contact: [facility]  Address: [address] [place], [place] [address] of [address] [place] [address] Phone: [phone]  Work Phone: [phone]  Relation: Child  Secondary Emergency Contact: [address] of [address] [place] [address] Phone: [phone]  Relation: Child    DVT Prophylaxis: []Lovenox [x]Heparin []PCD [] [address] []Encouraged ambulation  Disposition: []Med/Surg [] Intermediate [] ICU/CCU  Admit status: [] Observation [] Inpatient     +++++++++++++++++++++++++++++++++++++++++++++++++  Edinson Ferrer DO  +++++++++++++++++++++++++++++++++++++++++++++++++  NOTE: This report was transcribed using voice recognition software. Every effort was made to ensure accuracy; however, inadvertent computerized transcription errors may be present.

## 2022-08-19 NOTE — PLAN OF CARE
Problem: Discharge Planning  Goal: Discharge to home or other facility with appropriate resources  Outcome: Progressing  Flowsheets (Taken 8/19/2022 0305)  Discharge to home or other facility with appropriate resources:   Identify barriers to discharge with patient and caregiver   Identify discharge learning needs (meds, wound care, etc)   Refer to discharge planning if patient needs post-hospital services based on physician order or complex needs related to functional status, cognitive ability or social support system   Arrange for needed discharge resources and transportation as appropriate   Arrange for interpreters to assist at discharge as needed     Problem: Skin/Tissue Integrity  Goal: Absence of new skin breakdown  Description: 1. Monitor for areas of redness and/or skin breakdown  2. Assess vascular access sites hourly  3. Every 4-6 hours minimum:  Change oxygen saturation probe site  4. Every 4-6 hours:  If on nasal continuous positive airway pressure, respiratory therapy assess nares and determine need for appliance change or resting period.   Outcome: Progressing     Problem: Safety - Adult  Goal: Free from fall injury  Outcome: Progressing     Problem: ABCDS Injury Assessment  Goal: Absence of physical injury  Outcome: Progressing     Problem: Pain  Goal: Verbalizes/displays adequate comfort level or baseline comfort level  Outcome: Progressing

## 2022-08-19 NOTE — PROGRESS NOTES
Patient was seen and examined at bedside this morning. This is a additional note to the H&P written by my colleague this morning  Well oriented to time place and person. Explained about the plan regarding electrolyte correction and antibiotics for UTI  Sodium was reviewed and there was a bump from 1 22-1 32 from 10 PM to 5 AM.  Fluids were switched to D5 water because of the fast correction. Nephrology on board  Repeat sodium shows to be at 130.     Zara Lake MD

## 2022-08-19 NOTE — CONSULTS
[facility] MD Ania Traore MD Luisa Rudder, MD Bertrand Ferris, CNP   Janette Hilliard, ANP  Consultation  8/19/2022    Thank you for consult  Seen this afternoon  Full note dictated, to follow  Briefly, 80 y.o. gentleman admitted with episode of syncope. Does not recall circumstances with which he presented. Notes lately appetite has been poor: Notes that he drinks adequate amount of water. Most part denies all complaint. Serum sodium presentation was 122 mmol/L. Was started on IV normal saline at 100 cc/h. Serum sodium 10 hours later was 132 mmol/L. D5 water started. 4 hours later sodium 130 mmol/L. Assessment/recommendations  1. Hyponatremia, hypoosmolar, hypovolemic. Urinary indices are consistent with hypovolemic hyponatremia. Patient with initiation of IV fluid resuscitation with isotonic saline solution, serum sodium concentration increased briskly. D5 water has been started, and serum sodium has declined as expected.     Continue D5 water for now  Recheck BMP in 6 hours, adjust IV fluid content and rate as warranted  Encourage oral intake food and fluid  Continue supportive care    Ania Leggett MD, MD

## 2022-08-19 NOTE — ED PROVIDER NOTES
allergies. ---------------------------------------------------PHYSICAL EXAM--------------------------------------    Constitutional/General: Alert and oriented x2,  well appearing, non toxic in NAD  Head: Normocephalic and atraumatic  Eyes: PERRL, EOMI, conjunctive normal, sclera non icteric  Mouth: Oropharynx clear, handling secretions, no trismus, no asymmetry of the posterior oropharynx or uvular edema  Neck: Supple, full ROM, non tender to palpation in the midline, no stridor, no crepitus, no meningeal signs  Respiratory: Lungs clear to auscultation bilaterally, no wheezes, rales, or rhonchi. Not in respiratory distress  Cardiovascular:  Regular rate. Regular rhythm. No murmurs, gallops, or rubs. 2+ distal pulses  GI:  Abdomen Soft, mild suprapubic tenderness, Non distended. +BS. No organomegaly, no palpable masses,  No rebound, guarding, or rigidity. Musculoskeletal: Moves all extremities x 4. Warm and well perfused, no clubbing, cyanosis, or edema. Capillary refill <3 seconds  Integument: skin warm and dry. No rashes. Neurologic: GCS 14, no focal deficits, symmetric strength 5/5 in the upper and lower extremities bilaterally  Psychiatric: Normal Affect    -------------------------------------------------- RESULTS -------------------------------------------------  I have personally reviewed all laboratory and imaging results for this patient. Results are listed below.      LABS:  Results for orders placed or performed during the hospital encounter of 08/18/22   CBC with Auto Differential   Result Value Ref Range    WBC 10.6 4.5 - 11.5 E9/L    RBC 3.81 3.80 - 5.80 E12/L    Hemoglobin 10.0 (L) 12.5 - 16.5 g/dL    Hematocrit 30.9 (L) 37.0 - 54.0 %    MCV 81.1 80.0 - 99.9 fL    MCH 26.2 26.0 - 35.0 pg    MCHC 32.4 32.0 - 34.5 %    RDW 15.5 (H) 11.5 - 15.0 fL    Platelets 641 813 - 271 E9/L    MPV 10.6 7.0 - 12.0 fL    Neutrophils % 81.8 (H) 43.0 - 80.0 %    Immature Granulocytes % 1.8 0.0 - 5.0 % Lymphocytes % 10.4 (L) 20.0 - 42.0 %    Monocytes % 5.5 2.0 - 12.0 %    Eosinophils % 0.1 0.0 - 6.0 %    Basophils % 0.4 0.0 - 2.0 %    Neutrophils Absolute 8.66 (H) 1.80 - 7.30 E9/L    Immature Granulocytes # 0.19 E9/L    Lymphocytes Absolute 1.10 (L) 1.50 - 4.00 E9/L    Monocytes Absolute 0.58 0.10 - 0.95 E9/L    Eosinophils Absolute 0.01 (L) 0.05 - 0.50 E9/L    Basophils Absolute 0.04 0.00 - 0.20 L1/V   Basic Metabolic Panel w/ Reflex to MG   Result Value Ref Range    Sodium 122 (L) 132 - 146 mmol/L    Potassium reflex Magnesium 4.2 3.5 - 5.0 mmol/L    Chloride 90 (L) 98 - 107 mmol/L    CO2 15 (L) 22 - 29 mmol/L    Anion Gap 17 (H) 7 - 16 mmol/L    Glucose 124 (H) 74 - 99 mg/dL    BUN 14 6 - 23 mg/dL    Creatinine 1.2 0.7 - 1.2 mg/dL    GFR Non-African American 57 >=60 mL/min/1.73    GFR African American >60     Calcium 8.8 8.6 - 10.2 mg/dL   Urinalysis with Microscopic   Result Value Ref Range    Color, UA Yellow Straw/Yellow    Clarity, UA SL CLOUDY Clear    Glucose, Ur Negative Negative mg/dL    Bilirubin Urine Negative Negative    Ketones, Urine Negative Negative mg/dL    Specific Gravity, UA <=1.005 1.005 - 1.030    Blood, Urine LARGE (A) Negative    pH, UA 6.0 5.0 - 9.0    Protein, UA Negative Negative mg/dL    Urobilinogen, Urine 0.2 <2.0 E.U./dL    Nitrite, Urine Negative Negative    Leukocyte Esterase, Urine LARGE (A) Negative    WBC, UA 10-20 (A) 0 - 5 /HPF    RBC, UA 2-5 0 - 2 /HPF    Bacteria, UA RARE (A) None Seen /HPF   URINE ELECTROLYTES   Result Value Ref Range    Sodium, Ur 24 Not Established mmol/L    Potassium, Ur 20.2 Not Established mmol/L    Chloride 26 Not Established mmol/L   PROTEIN / CREATININE RATIO, URINE   Result Value Ref Range    Protein, Ur 13 (H) 0 - 12 mg/dL    Creatinine, Ur 39 (L) 40 - 278 mg/dL    Protein/Creat Ratio 0.3 (H) 0.0 - 0.2    Protein/Creat Ratio 0.3    Lactic Acid   Result Value Ref Range    Lactic Acid 1.0 0.5 - 2.2 mmol/L       RADIOLOGY:  Interpreted by Radiologist.  No orders to display       ------------------------- NURSING NOTES AND VITALS REVIEWED ---------------------------   The nursing notes within the ED encounter and vital signs as below have been reviewed by myself. /76   Pulse 87   Temp 98.5 °F (36.9 °C)   Resp 16   Ht 6' (1.829 m)   Wt 210 lb (95.3 kg)   SpO2 99%   BMI 28.48 kg/m²   Oxygen Saturation Interpretation: Normal    The patients available past medical records and past encounters were reviewed. ------------------------------ ED COURSE/MEDICAL DECISION MAKING----------------------  Medications   0.9 % sodium chloride infusion (has no administration in time range)   ondansetron (ZOFRAN) injection 4 mg (4 mg IntraVENous Given 8/18/22 2234)   cefTRIAXone (ROCEPHIN) 1,000 mg in sterile water 10 mL IV syringe (1,000 mg IntraVENous Given 8/18/22 2342)         ED COURSE:       Medical Decision Making: This is an 80-year-old male who presented to the ED for difficulty urinating. Patient had a Spencer catheter placed with 1000 cc drained. Patient underwent laboratory work-up showed normal CBC except for hemoglobin 10.0 and a hematocrit of 30.9. Chemistry showed a sodium of 122 CO2 is 15 and a gap 17 BUN/creatinine were within normal limits. Urinalysis shows a UTI. Nephrology was consulted. They recommended 1 cc/kg of NS with a repeat BMP in 4 hours and then every 6 hours after that. Patient was started on 95 cc's of NS here in the emergency department. Case discussed with hospitalist who is agreed admit the patient for further care. Please note that the withdrawal or failure to initiate urgent interventions for this patient would likely result in a life threatening deterioration or permanent disability. Accordingly this patient received 35 minutes of critical care time, excluding separately billable procedures.       I, Dr. David Pires, am the primary provider for this encounter    This patient's ED course included: a personal history and physicial examination, re-evaluation prior to disposition, and multiple bedside re-evaluations    This patient has remained hemodynamically stable during their ED course. Re-Evaluations:             Re-evaluation. Patients symptoms are improving      Counseling: The emergency provider has spoken with the patient and discussed todays results, in addition to providing specific details for the plan of care and counseling regarding the diagnosis and prognosis. Questions are answered at this time and they are agreeable with the plan.       --------------------------------- IMPRESSION AND DISPOSITION ---------------------------------    IMPRESSION  1. Hyponatremia    2. Urinary tract infection without hematuria, site unspecified        DISPOSITION  Disposition: Admit to telemetry  Patient condition is stable    NOTE: This report was transcribed using voice recognition software.  Every effort was made to ensure accuracy; however, inadvertent computerized transcription errors may be present        Vivian Ivan DO  08/19/22 9666

## 2022-08-19 NOTE — CARE COORDINATION
Care coordination:80year old male admitted with urinary retention, hyponatremia, /UTI. Nephrology consulted. SW met with patient to assess for discharge planning. Alert and oriented. Lives with his son in a one floor home . Son works as a  and patient frequently goes with him to the jobs \"just to [place]". He was independent , occasionally used a cane ,  No other DME . No HHC or RONALDO in past. . PCP is [facility] . Pharmacy is [facility] [address]. Discharge plan is to home. No needs identified at this  time. Son will be able to transport.

## 2022-08-20 LAB
ALBUMIN SERPL-MCNC: 3.3 G/DL (ref 3.5–5.2)
ALP BLD-CCNC: 263 U/L (ref 40–129)
ALT SERPL-CCNC: 6 U/L (ref 0–40)
ANION GAP SERPL CALCULATED.3IONS-SCNC: 10 MMOL/L (ref 7–16)
AST SERPL-CCNC: 16 U/L (ref 0–39)
BASOPHILS ABSOLUTE: 0.04 E9/L (ref 0–0.2)
BASOPHILS RELATIVE PERCENT: 0.6 % (ref 0–2)
BILIRUB SERPL-MCNC: 0.3 MG/DL (ref 0–1.2)
BUN BLDV-MCNC: 10 MG/DL (ref 6–23)
CALCIUM SERPL-MCNC: 8.4 MG/DL (ref 8.6–10.2)
CHLORIDE BLD-SCNC: 103 MMOL/L (ref 98–107)
CO2: 24 MMOL/L (ref 22–29)
CREAT SERPL-MCNC: 1 MG/DL (ref 0.7–1.2)
EOSINOPHILS ABSOLUTE: 0.1 E9/L (ref 0.05–0.5)
EOSINOPHILS RELATIVE PERCENT: 1.5 % (ref 0–6)
GFR AFRICAN AMERICAN: >60
GFR NON-AFRICAN AMERICAN: >60 ML/MIN/1.73
GLUCOSE BLD-MCNC: 102 MG/DL (ref 74–99)
HCT VFR BLD CALC: 30.2 % (ref 37–54)
HEMOGLOBIN: 9.8 G/DL (ref 12.5–16.5)
IMMATURE GRANULOCYTES #: 0.04 E9/L
IMMATURE GRANULOCYTES %: 0.6 % (ref 0–5)
LYMPHOCYTES ABSOLUTE: 1.14 E9/L (ref 1.5–4)
LYMPHOCYTES RELATIVE PERCENT: 16.8 % (ref 20–42)
MAGNESIUM: 1.9 MG/DL (ref 1.6–2.6)
MCH RBC QN AUTO: 26.3 PG (ref 26–35)
MCHC RBC AUTO-ENTMCNC: 32.5 % (ref 32–34.5)
MCV RBC AUTO: 81.2 FL (ref 80–99.9)
MONOCYTES ABSOLUTE: 0.48 E9/L (ref 0.1–0.95)
MONOCYTES RELATIVE PERCENT: 7.1 % (ref 2–12)
NEUTROPHILS ABSOLUTE: 4.98 E9/L (ref 1.8–7.3)
NEUTROPHILS RELATIVE PERCENT: 73.4 % (ref 43–80)
PDW BLD-RTO: 15.9 FL (ref 11.5–15)
PHOSPHORUS: 2.8 MG/DL (ref 2.5–4.5)
PLATELET # BLD: 213 E9/L (ref 130–450)
PMV BLD AUTO: 10.3 FL (ref 7–12)
POTASSIUM REFLEX MAGNESIUM: 3.3 MMOL/L (ref 3.5–5)
RBC # BLD: 3.72 E12/L (ref 3.8–5.8)
SODIUM BLD-SCNC: 137 MMOL/L (ref 132–146)
TOTAL PROTEIN: 6.1 G/DL (ref 6.4–8.3)
WBC # BLD: 6.8 E9/L (ref 4.5–11.5)

## 2022-08-20 PROCEDURE — 80053 COMPREHEN METABOLIC PANEL: CPT

## 2022-08-20 PROCEDURE — 2500000003 HC RX 250 WO HCPCS: Performed by: INTERNAL MEDICINE

## 2022-08-20 PROCEDURE — 2140000000 HC CCU INTERMEDIATE R&B

## 2022-08-20 PROCEDURE — 6360000002 HC RX W HCPCS: Performed by: FAMILY MEDICINE

## 2022-08-20 PROCEDURE — 84100 ASSAY OF PHOSPHORUS: CPT

## 2022-08-20 PROCEDURE — 83735 ASSAY OF MAGNESIUM: CPT

## 2022-08-20 PROCEDURE — 85025 COMPLETE CBC W/AUTO DIFF WBC: CPT

## 2022-08-20 PROCEDURE — 6370000000 HC RX 637 (ALT 250 FOR IP): Performed by: STUDENT IN AN ORGANIZED HEALTH CARE EDUCATION/TRAINING PROGRAM

## 2022-08-20 PROCEDURE — 2580000003 HC RX 258: Performed by: FAMILY MEDICINE

## 2022-08-20 PROCEDURE — 36415 COLL VENOUS BLD VENIPUNCTURE: CPT

## 2022-08-20 PROCEDURE — 6370000000 HC RX 637 (ALT 250 FOR IP): Performed by: FAMILY MEDICINE

## 2022-08-20 RX ORDER — POTASSIUM CHLORIDE 20 MEQ/1
40 TABLET, EXTENDED RELEASE ORAL PRN
Status: DISCONTINUED | OUTPATIENT
Start: 2022-08-20 | End: 2022-08-21 | Stop reason: HOSPADM

## 2022-08-20 RX ORDER — OLANZAPINE 10 MG/1
2.5 INJECTION, POWDER, LYOPHILIZED, FOR SOLUTION INTRAMUSCULAR ONCE
Status: COMPLETED | OUTPATIENT
Start: 2022-08-20 | End: 2022-08-20

## 2022-08-20 RX ORDER — POTASSIUM CHLORIDE 7.45 MG/ML
10 INJECTION INTRAVENOUS PRN
Status: DISCONTINUED | OUTPATIENT
Start: 2022-08-20 | End: 2022-08-21 | Stop reason: HOSPADM

## 2022-08-20 RX ADMIN — SODIUM CHLORIDE, PRESERVATIVE FREE 10 ML: 5 INJECTION INTRAVENOUS at 10:11

## 2022-08-20 RX ADMIN — TAMSULOSIN HYDROCHLORIDE 0.4 MG: 0.4 CAPSULE ORAL at 08:26

## 2022-08-20 RX ADMIN — ENOXAPARIN SODIUM 40 MG: 100 INJECTION SUBCUTANEOUS at 09:00

## 2022-08-20 RX ADMIN — POTASSIUM CHLORIDE 40 MEQ: 1500 TABLET, EXTENDED RELEASE ORAL at 10:10

## 2022-08-20 RX ADMIN — LISINOPRIL 20 MG: 20 TABLET ORAL at 08:26

## 2022-08-20 RX ADMIN — SODIUM CHLORIDE, PRESERVATIVE FREE 10 ML: 5 INJECTION INTRAVENOUS at 10:10

## 2022-08-20 RX ADMIN — OLANZAPINE 2.5 MG: 10 INJECTION, POWDER, LYOPHILIZED, FOR SOLUTION INTRAMUSCULAR at 23:59

## 2022-08-20 RX ADMIN — CEFTRIAXONE 1000 MG: 1 INJECTION, POWDER, FOR SOLUTION INTRAMUSCULAR; INTRAVENOUS at 22:07

## 2022-08-20 NOTE — PROGRESS NOTES
Hospitalist Progress Note      PCP: Woody Becerra, APRN - CNP    Date of Admission: 8/18/2022    Chief Complaint: UTI, Hyponatremia    Hospital Course:    Bayron Melara is a 80 y.o. male presenting to the ED for difficulty urinating, beginning 3 days ago. The complaint has been persistent, moderate in severity, and worsened by nothing. Per EMS and the patient's family or the patient has been having difficulty urinating for the last 3 days. The patient has been dribbling and not fully emptying his bladder. He has had increasing suprapubic pain. Due to his difficulty urinating he came to the ED to be evaluated. No fevers or chills. No nausea vomiting or diarrhea. HPI limited by patient's history of dementia. Vital signs within normal limits and stable. Laboratory studies demonstrate sodium 122, anion gap 17, glucose 124, hemoglobin 10.0. Urinalysis suggestive of infection. Patient given ceftriaxone. Nephrology consulted. Medicine consulted for admission. Subjective: Pt was seen at bedside and appears to be within normal limits, does not have any acute concerns at this time, awaiting urine culture, sodium improved.       Medications:  Reviewed    Infusion Medications    sodium chloride      dextrose 75 mL/hr at 08/19/22 0930     Scheduled Medications    aspirin  81 mg Oral Every Other Day    tamsulosin  0.4 mg Oral Daily    sodium chloride flush  10 mL IntraVENous 2 times per day    enoxaparin  40 mg SubCUTAneous Daily    cefTRIAXone (ROCEPHIN) IV  1,000 mg IntraVENous Q24H    lisinopril  20 mg Oral Daily     PRN Meds: potassium chloride **OR** potassium alternative oral replacement **OR** potassium chloride, sodium chloride flush, sodium chloride, promethazine **OR** ondansetron, polyethylene glycol, acetaminophen **OR** acetaminophen      Intake/Output Summary (Last 24 hours) at 8/20/2022 1039  Last data filed at 8/20/2022 0542  Gross per 24 hour   Intake --   Output 2850 ml   Net -2850 ml Exam:    /71   Pulse 85   Temp 97.7 °F (36.5 °C) (Oral)   Resp 20   Ht 6' (1.829 m)   Wt 210 lb (95.3 kg)   SpO2 99%   BMI 28.48 kg/m²     General appearance: No apparent distress, appears stated age and cooperative. HEENT: Pupils equal, round, and reactive to light. Conjunctivae/corneas clear. Neck: Supple, with full range of motion. No jugular venous distention. Trachea midline. Respiratory:  Normal respiratory effort. Clear to auscultation, bilaterally without Rales/Wheezes/Rhonchi. Cardiovascular: Regular rate and rhythm with normal S1/S2 without murmurs, rubs or gallops. Abdomen: Soft, non-tender, non-distended with normal bowel sounds. Musculoskeletal: No clubbing, cyanosis or edema bilaterally. Full range of motion without deformity. Skin: Skin color, texture, turgor normal.  No rashes or lesions. Neurologic:  Neurovascularly intact without any focal sensory/motor deficits. Cranial nerves: II-XII intact, grossly non-focal.  Psychiatric: Alert and oriented, thought content appropriate, normal insight    Labs:   Recent Labs     08/18/22  2209 08/19/22  0459 08/20/22  0617   WBC 10.6 8.0 6.8   HGB 10.0* 9.3* 9.8*   HCT 30.9* 29.4* 30.2*    202 213     Recent Labs     08/19/22  1034 08/19/22  1514 08/20/22  0617   * 137 137   K 3.9 3.6 3.3*    104 103   CO2 20* 20* 24   BUN 13 14 10   CREATININE 1.0 1.2 1.0   CALCIUM 8.4* 8.1* 8.4*   PHOS  --   --  2.8     Recent Labs     08/19/22  0459 08/20/22  0617   AST 16 16   ALT 5 6   BILITOT 0.5 0.3   ALKPHOS 276* 263*     No results for input(s): INR in the last 72 hours. No results for input(s): Sigrid Saab in the last 72 hours.     Assessment/Plan:    Active Hospital Problems    Diagnosis Date Noted    Hyponatremia [E87.1] 08/18/2022     Priority: Medium   -Hyponatremia-   -Urinary tract infection  -Urinary retention  -Coronary artery disease  -Hypertension    Continue on D5 W  Nephrology on board- apprec recs  Sodium at 137  Continue on Rocephin  Awaiting urine culture  Spencer removal and monitor for urinary retention    DVT Prophylaxis: lovenox    Diet: ADULT DIET;  Regular  Code Status: Full Code    Dispo - awaiting urine culture    Estuardo Shahid MD

## 2022-08-21 VITALS
SYSTOLIC BLOOD PRESSURE: 108 MMHG | DIASTOLIC BLOOD PRESSURE: 72 MMHG | TEMPERATURE: 97.3 F | RESPIRATION RATE: 20 BRPM | HEART RATE: 99 BPM | BODY MASS INDEX: 28.44 KG/M2 | HEIGHT: 72 IN | OXYGEN SATURATION: 96 % | WEIGHT: 210 LBS

## 2022-08-21 LAB
ALBUMIN SERPL-MCNC: 2.9 G/DL (ref 3.5–5.2)
ALP BLD-CCNC: 247 U/L (ref 40–129)
ALT SERPL-CCNC: 6 U/L (ref 0–40)
ANION GAP SERPL CALCULATED.3IONS-SCNC: 8 MMOL/L (ref 7–16)
AST SERPL-CCNC: 17 U/L (ref 0–39)
BASOPHILS ABSOLUTE: 0.04 E9/L (ref 0–0.2)
BASOPHILS RELATIVE PERCENT: 0.6 % (ref 0–2)
BILIRUB SERPL-MCNC: 0.3 MG/DL (ref 0–1.2)
BUN BLDV-MCNC: 12 MG/DL (ref 6–23)
CALCIUM SERPL-MCNC: 8.6 MG/DL (ref 8.6–10.2)
CHLORIDE BLD-SCNC: 108 MMOL/L (ref 98–107)
CO2: 20 MMOL/L (ref 22–29)
CREAT SERPL-MCNC: 0.9 MG/DL (ref 0.7–1.2)
EOSINOPHILS ABSOLUTE: 0.08 E9/L (ref 0.05–0.5)
EOSINOPHILS RELATIVE PERCENT: 1.1 % (ref 0–6)
GFR AFRICAN AMERICAN: >60
GFR NON-AFRICAN AMERICAN: >60 ML/MIN/1.73
GLUCOSE BLD-MCNC: 100 MG/DL (ref 74–99)
HCT VFR BLD CALC: 28.5 % (ref 37–54)
HEMOGLOBIN: 8.9 G/DL (ref 12.5–16.5)
IMMATURE GRANULOCYTES #: 0.1 E9/L
IMMATURE GRANULOCYTES %: 1.4 % (ref 0–5)
LYMPHOCYTES ABSOLUTE: 1.33 E9/L (ref 1.5–4)
LYMPHOCYTES RELATIVE PERCENT: 18.7 % (ref 20–42)
MCH RBC QN AUTO: 26.1 PG (ref 26–35)
MCHC RBC AUTO-ENTMCNC: 31.2 % (ref 32–34.5)
MCV RBC AUTO: 83.6 FL (ref 80–99.9)
MONOCYTES ABSOLUTE: 0.53 E9/L (ref 0.1–0.95)
MONOCYTES RELATIVE PERCENT: 7.5 % (ref 2–12)
NEUTROPHILS ABSOLUTE: 5.03 E9/L (ref 1.8–7.3)
NEUTROPHILS RELATIVE PERCENT: 70.7 % (ref 43–80)
ORGANISM: ABNORMAL
PDW BLD-RTO: 16.4 FL (ref 11.5–15)
PLATELET # BLD: 180 E9/L (ref 130–450)
PMV BLD AUTO: 11.3 FL (ref 7–12)
POTASSIUM REFLEX MAGNESIUM: 4.1 MMOL/L (ref 3.5–5)
RBC # BLD: 3.41 E12/L (ref 3.8–5.8)
SODIUM BLD-SCNC: 136 MMOL/L (ref 132–146)
TOTAL PROTEIN: 5.9 G/DL (ref 6.4–8.3)
URINE CULTURE, ROUTINE: ABNORMAL
WBC # BLD: 7.1 E9/L (ref 4.5–11.5)

## 2022-08-21 PROCEDURE — 80053 COMPREHEN METABOLIC PANEL: CPT

## 2022-08-21 PROCEDURE — 2580000003 HC RX 258: Performed by: FAMILY MEDICINE

## 2022-08-21 PROCEDURE — 6360000002 HC RX W HCPCS: Performed by: FAMILY MEDICINE

## 2022-08-21 PROCEDURE — 85025 COMPLETE CBC W/AUTO DIFF WBC: CPT

## 2022-08-21 PROCEDURE — 6370000000 HC RX 637 (ALT 250 FOR IP): Performed by: FAMILY MEDICINE

## 2022-08-21 PROCEDURE — 36415 COLL VENOUS BLD VENIPUNCTURE: CPT

## 2022-08-21 RX ORDER — TAMSULOSIN HYDROCHLORIDE 0.4 MG/1
0.4 CAPSULE ORAL DAILY
Qty: 90 CAPSULE | Refills: 0 | Status: SHIPPED | OUTPATIENT
Start: 2022-08-21 | End: 2022-08-21 | Stop reason: SDUPTHER

## 2022-08-21 RX ORDER — CEPHALEXIN 500 MG/1
500 CAPSULE ORAL 2 TIMES DAILY
Qty: 10 CAPSULE | Refills: 0 | Status: SHIPPED | OUTPATIENT
Start: 2022-08-21 | End: 2022-08-21 | Stop reason: SDUPTHER

## 2022-08-21 RX ORDER — CEPHALEXIN 500 MG/1
500 CAPSULE ORAL 2 TIMES DAILY
Qty: 10 CAPSULE | Refills: 0 | Status: SHIPPED | OUTPATIENT
Start: 2022-08-21 | End: 2022-08-30

## 2022-08-21 RX ORDER — LISINOPRIL AND HYDROCHLOROTHIAZIDE 20; 12.5 MG/1; MG/1
1 TABLET ORAL DAILY
Qty: 90 TABLET | Refills: 0 | Status: SHIPPED | OUTPATIENT
Start: 2022-08-21 | End: 2022-09-20

## 2022-08-21 RX ORDER — LISINOPRIL AND HYDROCHLOROTHIAZIDE 20; 12.5 MG/1; MG/1
1 TABLET ORAL DAILY
Qty: 90 TABLET | Refills: 0 | Status: SHIPPED | OUTPATIENT
Start: 2022-08-21 | End: 2022-08-21 | Stop reason: SDUPTHER

## 2022-08-21 RX ORDER — TAMSULOSIN HYDROCHLORIDE 0.4 MG/1
0.4 CAPSULE ORAL DAILY
Qty: 90 CAPSULE | Refills: 0 | Status: SHIPPED | OUTPATIENT
Start: 2022-08-21

## 2022-08-21 RX ADMIN — TAMSULOSIN HYDROCHLORIDE 0.4 MG: 0.4 CAPSULE ORAL at 09:11

## 2022-08-21 RX ADMIN — LISINOPRIL 20 MG: 20 TABLET ORAL at 09:11

## 2022-08-21 RX ADMIN — ASPIRIN 81 MG: 81 TABLET, COATED ORAL at 09:11

## 2022-08-21 RX ADMIN — SODIUM CHLORIDE, PRESERVATIVE FREE 10 ML: 5 INJECTION INTRAVENOUS at 09:11

## 2022-08-21 RX ADMIN — ENOXAPARIN SODIUM 40 MG: 100 INJECTION SUBCUTANEOUS at 09:11

## 2022-08-21 NOTE — CONSULTS
8/21/2022 10:00 AM  Service: Urology  Group: [facility] (Riley/Courtney/Devorah)    Irena Esteves  [id number]     Chief Complaint: AUR    History of Present Illness: The patient is a 80 y.o. male patient who presents with UTI  He has been seen us in the past  He does have a HI increased PSA with positive prostate bx in 2012 with GS 3+3=6 prostate cancer  He was not able to void  A mckay was placed  He was found to have AUR  He has been placed on antibiotics for a UTI  He has not seen any gross hematuria      Past Medical History:   Diagnosis Date    CAD (coronary artery disease)     Chest pain 4/2002    Hospitalized at [facility]. Chronic back pain     Hyperlipidemia     Hypertension 1987    Industrial accident 1977    (Back, neck and head). Lump     Removed from elbow. MI (myocardial infarction) ([address]     Anterior apical.    MI (myocardial infarction) ([address] 2/10/1999    Acute inferior wall MI.    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 DIAGNOSTIC CARDIAC CATH LAB PROCEDURE  4/9/1991    [address] [facility].  CAD. DIAGNOSTIC CARDIAC CATH LAB PROCEDURE  2/10/1999    [address] [address]. DIAGNOSTIC CARDIAC CATH LAB PROCEDURE  11/18/2005    [facility]: Dr. Adam Shock CATH LAB PROCEDURE  2/7/2006    With abdominal aortogram.    DIAGNOSTIC CARDIAC CATH LAB PROCEDURE  2/11/2009    [facility]. LUMBAR DISCECTOMY      THROMBECTOMY  11/18/2005    Exploration/thrombectomy of right common femoral and right popliteal arteries.        Medications Prior to Admission:    Medications Prior to Admission: tamsulosin (FLOMAX) 0.4 MG capsule, Take 1 capsule by mouth daily  lisinopril-hydrochlorothiazide (PRINZIDE;ZESTORETIC) 20-12.5 MG per tablet, Take 1 tablet by mouth daily  celecoxib (CELEBREX) 100 MG capsule, Take 1 capsule by mouth 2 times daily  aspirin 81 MG EC tablet, Take 1 tablet by mouth every other day    Allergies:    Patient has no known allergies. Social History:    reports that he has never smoked. He has never used smokeless tobacco. He reports that he does not drink alcohol and does not use drugs. Family History:   Non-contributory to this urological problem  family history includes Heart Disease in his mother. Review of Systems:  Respiratory: negative for cough and hemoptysis  Cardiovascular: negative for chest pain and dyspnea  Gastrointestinal: negative for abdominal pain, diarrhea, nausea and vomiting  Derm: negative for rash and skin lesion(s)  Neurological: negative for seizures and tremors  Endocrine: negative for diabetic symptoms including polydipsia and polyuria  : As above in the HPI, otherwise negative  All other reviews are negative    Physical Exam:   Vitals: /72   Pulse 99   Temp 97.3 °F (36.3 °C)   Resp 20   Ht 6' (1.829 m)   Wt 210 lb (95.3 kg)   SpO2 96%   BMI 28.48 kg/m²   General:  Awake, alert, oriented X 3. Well developed, well nourished, well groomed. No apparent distress. HEENT:  Normocephalic, atraumatic. Pupils equal, round. No scleral icterus. No conjunctival injection. Normal lips, teeth, and gums. No nasal discharge. Neck:  Supple, no masses. Heart:  RRR  Lungs:  No audible wheezing. Respirations symmetric and non-labored.   Abdomen:  soft, nontender, no masses, no organomegaly, no peritoneal signs  Extremities:  No clubbing, cyanosis, or edema  Skin:  Warm and dry, no open lesions or rashes  Neuro:  Cranial nerves 2-12 intact, no focal deficits  Rectal: deferred  Genitalia:  Spencer clear    Labs:   Recent Labs     08/19/22  0459 08/20/22  0617 08/21/22  0540   WBC 8.0 6.8 7.1   RBC 3.67* 3.72* 3.41*   HGB 9.3* 9.8* 8.9*   HCT 29.4* 30.2* 28.5*   MCV 80.1 81.2 83.6   MCH 25.3* 26.3 26.1   MCHC 31.6* 32.5 31.2*   RDW 15.8* 15.9* 16.4*    213 180   MPV 10.3 10.3 11.3       Recent Labs     08/19/22  1514 08/20/22  0617 08/21/22  0540   CREATININE 1.2 1.0 0.9     Diagnosis-          A. Prostate needle biopsy, left base-  Benign   prostatic tissue   and minute fragments of benign rectal mucosa. B.  Prostate needle biopsy, left mid-  Benign prostatic tissue. C.  Prostate needle biopsy, left apex-  A microscopic focus of   adenocarcinoma (Schroeder grade 3+3=6) involving less than 5% of one   out of two core biopsies, see comment. D.  Prostate needle biopsy, right base-  Benign prostatic tissue. E.  Prostate needle biopsy, right mid-  Benign prostatic tissue. F.  Prostate needle biopsy, right apex-  Benign prostatic tissue.    Images:      Assessment: Deneen Rose Mary 80 y.o. male     UTI  AUR  BPH  PCa   Increased PSA post Bx in 2012 (3+3=6)    Plan:    Cont the mckay  VT when stable  Cont the IVF  Cont the antibiotics  Will need outpatient tomi Novak Riley, DO   [facility]

## 2022-08-21 NOTE — FLOWSHEET NOTE
IV site discontinued. Discharge instructions discussed with patient and his son, Helena Oakes. Educated on mckay care. Son verbalized understanding.

## 2022-08-21 NOTE — DISCHARGE SUMMARY
Hospitalist Discharge Summary    Patient ID: Yahir Jacobs   Patient : 1934  Patient's PCP: AMERICO Moreno - CNP    Admit Date: 2022   Admitting Physician: Fabricio Kemp DO    Discharge Date:  2022   Discharge Physician: Evens Rondon MD   Discharge Condition: Stable  Discharge Disposition: LTAC, located within [facility] course in brief:  (Please refer to daily progress notes for a comprehensive review of the hospitalization by requesting medical records)   Yahir Jacobs is a 80 y.o. male presenting to the ED for difficulty urinating, beginning 3 days ago. The complaint has been persistent, moderate in severity, and worsened by nothing. Per EMS and the patient's family or the patient has been having difficulty urinating for the last 3 days. The patient has been dribbling and not fully emptying his bladder. He has had increasing suprapubic pain. Due to his difficulty urinating he came to the ED to be evaluated. No fevers or chills. No nausea vomiting or diarrhea. HPI limited by patient's history of dementia. Vital signs within normal limits and stable. Laboratory studies demonstrate sodium 122, anion gap 17, glucose 124, hemoglobin 10.0. Urinalysis suggestive of infection. Patient given ceftriaxone. Nephrology consulted. Medicine consulted for admission. Patient has been noncompliant with his medications for BPH due to his history of dementia and subsequently developed urinary retention and a UTI. Urine cultures were ordered and based on sensitivities was switched from Rocephin to Keflex at discharge. Hyponatremia was corrected with fluids-nephrology was on board and helped with recommendations. Patient was placed on Spencer for the urinary retention and was restarted on his Flomax. Patient did not improve with respect to his urinary retention upon removal of the Spencer-urology was consulted and will be followed up outpatient and deemed stable for discharge on the Spencer. Son was present in the room during my evaluation and discussed regarding the plan. Consults:   IP CONSULT TO INTERNAL MEDICINE  IP CONSULT TO NEPHROLOGY  IP CONSULT TO UROLOGY    Discharge Diagnoses:  -Hyponatremia- resolved  -Urinary tract infection- Proteus  -Urinary retention- DC on mckay - outpt Urology  -BPH- non compliant with meds- flomax  -Coronary artery disease.  - Dementia  -Hypertension      Discharge Instructions / Follow up: Follow-up with PCP within 1 week of discharge. Follow-up with consultants as indicated by them. Compliance with medications as prescribed on discharge. No future appointments. The patient's condition is stable. At this time the patient is without objective evidence of an acute process requiring continuing hospitalization or inpatient management. They are stable for discharge with outpatient follow-up. I have spoken with the patient and discussed the results of the current hospitalization, in addition to providing specific details for the plan of care and counseling regarding the diagnosis and prognosis. The plan has been discussed in detail and they are aware of the specific conditions for emergent return, as well as the importance of follow-up. Their questions are answered at this time and they are agreeable with the plan for discharge to home. Continued appropriate risk factor modification of blood pressure, diabetes and serum lipids will remain essential to reducing risk of future atherosclerotic development    Activity: activity as tolerated    Physical exam:  General appearance: No apparent distress, appears stated age and cooperative. HEENT: Conjunctivae/corneas clear. Mucous membranes moist.  Neck: Supple. No JVD. Respiratory:  Clear to auscultation bilaterally. Normal respiratory effort. Cardiovascular:  RRR. S1, S2 without MRG. PV: Pulses palpable. No edema. Abdomen: Soft, non-tender, non-distended.  +BS  : Mckay  Musculoskeletal: No obvious deformities. Skin: Normal skin color. No rashes or lesions. Good turgor. Neurologic:  Grossly non-focal. Awake, alert, following commands. Psychiatric: Alert and oriented, thought content appropriate, normal insight and judgement    Significant labs:  CBC:   Recent Labs     08/19/22  0459 08/20/22  0617 08/21/22  0540   WBC 8.0 6.8 7.1   RBC 3.67* 3.72* 3.41*   HGB 9.3* 9.8* 8.9*   HCT 29.4* 30.2* 28.5*   MCV 80.1 81.2 83.6   RDW 15.8* 15.9* 16.4*    213 180     BMP:   Recent Labs     08/19/22  1514 08/20/22  0617 08/21/22  0540    137 136   K 3.6 3.3* 4.1    103 108*   CO2 20* 24 20*   BUN 14 10 12   CREATININE 1.2 1.0 0.9   MG  --  1.9  --    PHOS  --  2.8  --      LFT:  Recent Labs     08/19/22  0459 08/20/22  0617 08/21/22  0540   PROT 6.0* 6.1* 5.9*   ALKPHOS 276* 263* 247*   ALT 5 6 6   AST 16 16 17   BILITOT 0.5 0.3 0.3     PT/INR: No results for input(s): INR, APTT in the last 72 hours. BNP: No results for input(s): BNP in the last 72 hours. Hgb A1C:   Lab Results   Component Value Date    LABA1C 5.8 11/06/2015     Folate and B12:   Lab Results   Component Value Date    BNWTOGHD54 302 11/06/2015   ,   Lab Results   Component Value Date    FOLATE 11.9 11/06/2015     Thyroid Studies:   Lab Results   Component Value Date    TSH 1.210 09/04/2019       Urinalysis:    Lab Results   Component Value Date/Time    NITRU Negative 08/18/2022 10:09 PM    WBCUA 10-20 08/18/2022 10:09 PM    BACTERIA RARE 08/18/2022 10:09 PM    RBCUA 2-5 08/18/2022 10:09 PM    BLOODU LARGE 08/18/2022 10:09 PM    SPECGRAV <=1.005 08/18/2022 10:09 PM    GLUCOSEU Negative 08/18/2022 10:09 PM       Imaging:  No results found.     Discharge Medications:      Medication List        START taking these medications      cephALEXin 500 MG capsule  Commonly known as: KEFLEX  Take 1 capsule by mouth 2 times daily            CONTINUE taking these medications      aspirin 81 MG EC tablet  Take 1 tablet by mouth every other day     celecoxib 100 MG capsule  Commonly known as: CELEBREX  Take 1 capsule by mouth 2 times daily     lisinopril-hydroCHLOROthiazide 20-12.5 MG per tablet  Commonly known as: PRINZIDE;ZESTORETIC  Take 1 tablet by mouth daily     tamsulosin 0.4 MG capsule  Commonly known as: FLOMAX  Take 1 capsule by mouth daily               Where to Get Your Medications        These medications were sent to [facility] [address] [address]      Phone: [phone]   cephALEXin 500 MG capsule  lisinopril-hydroCHLOROthiazide 20-12.5 MG per tablet  tamsulosin 0.4 MG capsule         Time Spent on discharge is more than 45 minutes in the examination, evaluation, counseling and review of medications and discharge plan.    +++++++++++++++++++++++++++++++++++++++++++++++++  MD KRYSTEN Lundy/ Anselmo Lagunas [address] [place] [address]  +++++++++++++++++++++++++++++++++++++++++++++++++  NOTE: This report was transcribed using voice recognition software. Every effort was made to ensure accuracy; however, inadvertent computerized transcription errors may be present.

## 2022-08-21 NOTE — FLOWSHEET NOTE
Patient was having a difficult time urinating. Bladder scan showed 622. Covering attending gave orders to replace mckay.      0400 mckay inserted,

## 2022-08-23 ENCOUNTER — HOSPITAL ENCOUNTER (EMERGENCY)
Age: 87
Discharge: HOME | End: 2022-08-23
Attending: STUDENT IN AN ORGANIZED HEALTH CARE EDUCATION/TRAINING PROGRAM
Payer: COMMERCIAL

## 2022-08-23 VITALS
HEART RATE: 115 BPM | RESPIRATION RATE: 16 BRPM | WEIGHT: 200 LBS | SYSTOLIC BLOOD PRESSURE: 94 MMHG | TEMPERATURE: 100.1 F | OXYGEN SATURATION: 95 % | BODY MASS INDEX: 27.12 KG/M2 | DIASTOLIC BLOOD PRESSURE: 86 MMHG

## 2022-08-23 DIAGNOSIS — I95.9 HYPOTENSION, UNSPECIFIED HYPOTENSION TYPE: ICD-10-CM

## 2022-08-23 DIAGNOSIS — Z53.21 ELOPED FROM EMERGENCY DEPARTMENT: Primary | ICD-10-CM

## 2022-08-23 DIAGNOSIS — R00.0 TACHYCARDIA: ICD-10-CM

## 2022-08-23 LAB
AMORPHOUS: ABNORMAL
ANION GAP SERPL CALCULATED.3IONS-SCNC: 12 MMOL/L (ref 7–16)
BACTERIA: ABNORMAL /HPF
BASOPHILS ABSOLUTE: 0.04 E9/L (ref 0–0.2)
BASOPHILS RELATIVE PERCENT: 0.3 % (ref 0–2)
BILIRUBIN URINE: NEGATIVE
BLOOD, URINE: ABNORMAL
BUN BLDV-MCNC: 18 MG/DL (ref 6–23)
CALCIUM SERPL-MCNC: 8.7 MG/DL (ref 8.6–10.2)
CHLORIDE BLD-SCNC: 107 MMOL/L (ref 98–107)
CLARITY: ABNORMAL
CO2: 22 MMOL/L (ref 22–29)
COLOR: YELLOW
CREAT SERPL-MCNC: 1 MG/DL (ref 0.7–1.2)
EOSINOPHILS ABSOLUTE: 0.02 E9/L (ref 0.05–0.5)
EOSINOPHILS RELATIVE PERCENT: 0.2 % (ref 0–6)
GFR AFRICAN AMERICAN: >60
GFR NON-AFRICAN AMERICAN: >60 ML/MIN/1.73
GLUCOSE BLD-MCNC: 131 MG/DL (ref 74–99)
GLUCOSE URINE: NEGATIVE MG/DL
HCT VFR BLD CALC: 30.9 % (ref 37–54)
HEMOGLOBIN: 9.9 G/DL (ref 12.5–16.5)
IMMATURE GRANULOCYTES #: 0.11 E9/L
IMMATURE GRANULOCYTES %: 1 % (ref 0–5)
KETONES, URINE: NEGATIVE MG/DL
LACTIC ACID: 1.6 MMOL/L (ref 0.5–2.2)
LEUKOCYTE ESTERASE, URINE: ABNORMAL
LYMPHOCYTES ABSOLUTE: 1.44 E9/L (ref 1.5–4)
LYMPHOCYTES RELATIVE PERCENT: 12.5 % (ref 20–42)
MCH RBC QN AUTO: 25.9 PG (ref 26–35)
MCHC RBC AUTO-ENTMCNC: 32 % (ref 32–34.5)
MCV RBC AUTO: 80.9 FL (ref 80–99.9)
MONOCYTES ABSOLUTE: 0.98 E9/L (ref 0.1–0.95)
MONOCYTES RELATIVE PERCENT: 8.5 % (ref 2–12)
NEUTROPHILS ABSOLUTE: 8.91 E9/L (ref 1.8–7.3)
NEUTROPHILS RELATIVE PERCENT: 77.5 % (ref 43–80)
NITRITE, URINE: NEGATIVE
PDW BLD-RTO: 16.5 FL (ref 11.5–15)
PH UA: 5.5 (ref 5–9)
PLATELET # BLD: 247 E9/L (ref 130–450)
PMV BLD AUTO: 10.2 FL (ref 7–12)
POTASSIUM SERPL-SCNC: 4 MMOL/L (ref 3.5–5)
PROTEIN UA: 100 MG/DL
RBC # BLD: 3.82 E12/L (ref 3.8–5.8)
RBC UA: >20 /HPF (ref 0–2)
SARS-COV-2, NAAT: NOT DETECTED
SODIUM BLD-SCNC: 141 MMOL/L (ref 132–146)
SPECIFIC GRAVITY UA: >=1.03 (ref 1–1.03)
UROBILINOGEN, URINE: 0.2 E.U./DL
WBC # BLD: 11.5 E9/L (ref 4.5–11.5)
WBC UA: ABNORMAL /HPF (ref 0–5)

## 2022-08-23 PROCEDURE — 83605 ASSAY OF LACTIC ACID: CPT

## 2022-08-23 PROCEDURE — 81001 URINALYSIS AUTO W/SCOPE: CPT

## 2022-08-23 PROCEDURE — 87040 BLOOD CULTURE FOR BACTERIA: CPT

## 2022-08-23 PROCEDURE — 87088 URINE BACTERIA CULTURE: CPT

## 2022-08-23 PROCEDURE — 6370000000 HC RX 637 (ALT 250 FOR IP): Performed by: PHYSICIAN ASSISTANT

## 2022-08-23 PROCEDURE — 99283 EMERGENCY DEPT VISIT LOW MDM: CPT

## 2022-08-23 PROCEDURE — 80048 BASIC METABOLIC PNL TOTAL CA: CPT

## 2022-08-23 PROCEDURE — 85025 COMPLETE CBC W/AUTO DIFF WBC: CPT

## 2022-08-23 PROCEDURE — 87635 SARS-COV-2 COVID-19 AMP PRB: CPT

## 2022-08-23 PROCEDURE — 36415 COLL VENOUS BLD VENIPUNCTURE: CPT

## 2022-08-23 RX ORDER — ACETAMINOPHEN 500 MG
1000 TABLET ORAL ONCE
Status: COMPLETED | OUTPATIENT
Start: 2022-08-23 | End: 2022-08-23

## 2022-08-23 RX ADMIN — ACETAMINOPHEN 1000 MG: 500 TABLET ORAL at 15:09

## 2022-08-23 ASSESSMENT — PAIN - FUNCTIONAL ASSESSMENT: PAIN_FUNCTIONAL_ASSESSMENT: NONE - DENIES PAIN

## 2022-08-23 NOTE — ED NOTES
Department of Emergency Medicine  FIRST PROVIDER ELOPEMENT NOTE                 [facility]          8/23/22  4:05 PM EDT    MRN: [id number]    HPI: Lynette Juarez is a 80 y.o. male who presents to the ED with the following complaint: Urinary Catheter (PT STATES THAT HE ACCIDENTALLY PULLED ON THE URINARY CATHETER. FAMILY STATES THAT THEY BROUGHT HIM IN TO HAVE IT REMOVED. )    Patient requesting to leave [address] Did discuss the case with my attending physician as well as the charge nurse and we made a bed available so that he could be brought back to the emergency department. I will back out into the waiting room to bring the patient back and the staff at the front of the emergency room stated that they left. Patient was unable to be found outside.     (Please refer to [facility] NOTE for pertinent ROS and PE documentation)  Labs:  Results for orders placed or performed during the hospital encounter of 08/23/22   Urinalysis with Microscopic   Result Value Ref Range    Color, UA Yellow Straw/Yellow    Clarity, UA CLOUDY (A) Clear    Glucose, Ur Negative Negative mg/dL    Bilirubin Urine Negative Negative    Ketones, Urine Negative Negative mg/dL    Specific Gravity, UA >=1.030 1.005 - 1.030    Blood, Urine LARGE (A) Negative    pH, UA 5.5 5.0 - 9.0    Protein,  (A) Negative mg/dL    Urobilinogen, Urine 0.2 <2.0 E.U./dL    Nitrite, Urine Negative Negative    Leukocyte Esterase, Urine TRACE (A) Negative    WBC, UA 1-3 0 - 5 /HPF    RBC, UA >20 0 - 2 /HPF    Bacteria, UA FEW (A) None Seen /HPF    Amorphous, UA FEW    CBC with Auto Differential   Result Value Ref Range    WBC 11.5 4.5 - 11.5 E9/L    RBC 3.82 3.80 - 5.80 E12/L    Hemoglobin 9.9 (L) 12.5 - 16.5 g/dL    Hematocrit 30.9 (L) 37.0 - 54.0 %    MCV 80.9 80.0 - 99.9 fL    MCH 25.9 (L) 26.0 - 35.0 pg    MCHC 32.0 32.0 - 34.5 %    RDW 16.5 (H) 11.5 - 15.0 fL    Platelets 077 264 - 963 E9/L    MPV 10.2 7.0 - 12.0 fL Neutrophils % 77.5 43.0 - 80.0 %    Immature Granulocytes % 1.0 0.0 - 5.0 %    Lymphocytes % 12.5 (L) 20.0 - 42.0 %    Monocytes % 8.5 2.0 - 12.0 %    Eosinophils % 0.2 0.0 - 6.0 %    Basophils % 0.3 0.0 - 2.0 %    Neutrophils Absolute 8.91 (H) 1.80 - 7.30 E9/L    Immature Granulocytes # 0.11 E9/L    Lymphocytes Absolute 1.44 (L) 1.50 - 4.00 E9/L    Monocytes Absolute 0.98 (H) 0.10 - 0.95 E9/L    Eosinophils Absolute 0.02 (L) 0.05 - 0.50 E9/L    Basophils Absolute 0.04 0.00 - 0.20 E9/L     Imaging: All Radiology results interpreted by Radiologist unless otherwise noted. No orders to display     ED Course      Medications   acetaminophen (TYLENOL) tablet 1,000 mg (1,000 mg Oral Given 8/23/22 9095)        -------------------------  Disposition    Patient ELOPED from the department prior to completion of evaluation after triage. Results of triage orders that were completed at time of elopement as indicated above were reviewed. Diagnosis at Time of Elopement: (Based on presenting complaint/available test results):      Hypertension  Tachycardia  Encounter for Spencer catheter care    Electronically signed by Cathi Charlton PA-C   DD: 8/23/22       Cathi Charlton PA-C  08/23/22 6035

## 2022-08-23 NOTE — ED NOTES
Department of Emergency Medicine  FIRST PROVIDER TRIAGE NOTE             [facility]           8/23/22  2:37 PM EDT    Date of Encounter: 8/23/22   MRN: [id number]      HPI: Rowena Bender is a 80 y.o. male who presents to the ED for Urinary Catheter ( [address] PULLED ON THE URINARY CATHETER. FAMILY STATES THAT THEY BROUGHT HIM IN TO HAVE IT REMOVED. )     Patient pulled out his own Spencer catheter. Unable to urinate. ROS: Negative for cp, sob, back pain, dizziness, or abd pain. PE: Gen Appearance/Constitutional: alert     Initial Plan of Care: All treatment areas with department are currently occupied. Plan to order/Initiate the following while awaiting opening in ED: labs.   Initiate Treatment-Testing, Proceed toTreatment Area When Bed Available for ED Attending/MLP to Continue Care    Electronically signed by Marguerite Frankel, PA-C   DD: 8/23/22       Marguerite Frankel, PA-C  08/23/22 0781

## 2022-08-23 NOTE — ED PROVIDER NOTES
Patient eloped from ED before being brought back to ED room I did not see or evaluate patient  Patient could not be found, staff attempted to contact patient      Gunner Allen MD  08/23/22 [id number]

## 2022-08-24 LAB — URINE CULTURE, ROUTINE: NORMAL

## 2022-08-28 LAB
BLOOD CULTURE, ROUTINE: NORMAL
CULTURE, BLOOD 2: NORMAL

## 2022-08-30 ENCOUNTER — HOSPITAL ENCOUNTER (EMERGENCY)
Age: 87
Discharge: HOME | End: 2022-08-30
Attending: EMERGENCY MEDICINE
Payer: MEDICARE

## 2022-08-30 VITALS
HEIGHT: 73 IN | RESPIRATION RATE: 16 BRPM | OXYGEN SATURATION: 98 % | HEART RATE: 78 BPM | WEIGHT: 200 LBS | SYSTOLIC BLOOD PRESSURE: 138 MMHG | TEMPERATURE: 98.2 F | DIASTOLIC BLOOD PRESSURE: 82 MMHG | BODY MASS INDEX: 26.51 KG/M2

## 2022-08-30 DIAGNOSIS — N30.00 ACUTE CYSTITIS WITHOUT HEMATURIA: ICD-10-CM

## 2022-08-30 DIAGNOSIS — R33.9 URINARY RETENTION: Primary | ICD-10-CM

## 2022-08-30 LAB
ACETAMINOPHEN LEVEL: <5 MCG/ML (ref 10–30)
ALBUMIN SERPL-MCNC: 3.3 G/DL (ref 3.5–5.2)
ALP BLD-CCNC: 319 U/L (ref 40–129)
ALT SERPL-CCNC: 7 U/L (ref 0–40)
AMPHETAMINE SCREEN, URINE: NOT DETECTED
ANION GAP SERPL CALCULATED.3IONS-SCNC: 11 MMOL/L (ref 7–16)
AST SERPL-CCNC: 12 U/L (ref 0–39)
BACTERIA: ABNORMAL /HPF
BARBITURATE SCREEN URINE: NOT DETECTED
BASOPHILS ABSOLUTE: 0.04 E9/L (ref 0–0.2)
BASOPHILS RELATIVE PERCENT: 0.5 % (ref 0–2)
BENZODIAZEPINE SCREEN, URINE: NOT DETECTED
BILIRUB SERPL-MCNC: 0.3 MG/DL (ref 0–1.2)
BILIRUBIN URINE: NEGATIVE
BLOOD, URINE: ABNORMAL
BUN BLDV-MCNC: 14 MG/DL (ref 6–23)
CALCIUM SERPL-MCNC: 8.5 MG/DL (ref 8.6–10.2)
CANNABINOID SCREEN URINE: NOT DETECTED
CHLORIDE BLD-SCNC: 104 MMOL/L (ref 98–107)
CLARITY: CLEAR
CO2: 21 MMOL/L (ref 22–29)
COCAINE METABOLITE SCREEN URINE: NOT DETECTED
COLOR: YELLOW
CREAT SERPL-MCNC: 1 MG/DL (ref 0.7–1.2)
EOSINOPHILS ABSOLUTE: 0.17 E9/L (ref 0.05–0.5)
EOSINOPHILS RELATIVE PERCENT: 2.1 % (ref 0–6)
ETHANOL: <10 MG/DL (ref 0–0.08)
FENTANYL SCREEN, URINE: NOT DETECTED
GFR AFRICAN AMERICAN: >60
GFR NON-AFRICAN AMERICAN: >60 ML/MIN/1.73
GLUCOSE BLD-MCNC: 101 MG/DL (ref 74–99)
GLUCOSE URINE: NEGATIVE MG/DL
HCT VFR BLD CALC: 29.2 % (ref 37–54)
HEMOGLOBIN: 9.1 G/DL (ref 12.5–16.5)
IMMATURE GRANULOCYTES #: 0.07 E9/L
IMMATURE GRANULOCYTES %: 0.9 % (ref 0–5)
KETONES, URINE: NEGATIVE MG/DL
LEUKOCYTE ESTERASE, URINE: ABNORMAL
LYMPHOCYTES ABSOLUTE: 1.85 E9/L (ref 1.5–4)
LYMPHOCYTES RELATIVE PERCENT: 23.1 % (ref 20–42)
Lab: NORMAL
MAGNESIUM: 2 MG/DL (ref 1.6–2.6)
MCH RBC QN AUTO: 25.6 PG (ref 26–35)
MCHC RBC AUTO-ENTMCNC: 31.2 % (ref 32–34.5)
MCV RBC AUTO: 82 FL (ref 80–99.9)
METHADONE SCREEN, URINE: NOT DETECTED
MONOCYTES ABSOLUTE: 0.47 E9/L (ref 0.1–0.95)
MONOCYTES RELATIVE PERCENT: 5.9 % (ref 2–12)
NEUTROPHILS ABSOLUTE: 5.4 E9/L (ref 1.8–7.3)
NEUTROPHILS RELATIVE PERCENT: 67.5 % (ref 43–80)
NITRITE, URINE: NEGATIVE
OPIATE SCREEN URINE: NOT DETECTED
OXYCODONE URINE: NOT DETECTED
PDW BLD-RTO: 15.9 FL (ref 11.5–15)
PH UA: 6 (ref 5–9)
PHENCYCLIDINE SCREEN URINE: NOT DETECTED
PHOSPHORUS: 3 MG/DL (ref 2.5–4.5)
PLATELET # BLD: 280 E9/L (ref 130–450)
PMV BLD AUTO: 9.6 FL (ref 7–12)
POTASSIUM SERPL-SCNC: 4 MMOL/L (ref 3.5–5)
PRO-BNP: 3734 PG/ML (ref 0–450)
PROTEIN UA: ABNORMAL MG/DL
RBC # BLD: 3.56 E12/L (ref 3.8–5.8)
RBC UA: ABNORMAL /HPF (ref 0–2)
SALICYLATE, SERUM: <0.3 MG/DL (ref 0–30)
SODIUM BLD-SCNC: 136 MMOL/L (ref 132–146)
SPECIFIC GRAVITY UA: 1.01 (ref 1–1.03)
TOTAL PROTEIN: 6.5 G/DL (ref 6.4–8.3)
TRICYCLIC ANTIDEPRESSANTS SCREEN SERUM: NEGATIVE NG/ML
UROBILINOGEN, URINE: 0.2 E.U./DL
WBC # BLD: 8 E9/L (ref 4.5–11.5)
WBC UA: ABNORMAL /HPF (ref 0–5)
YEAST: PRESENT /HPF

## 2022-08-30 PROCEDURE — 81001 URINALYSIS AUTO W/SCOPE: CPT

## 2022-08-30 PROCEDURE — 2580000003 HC RX 258: Performed by: STUDENT IN AN ORGANIZED HEALTH CARE EDUCATION/TRAINING PROGRAM

## 2022-08-30 PROCEDURE — 85025 COMPLETE CBC W/AUTO DIFF WBC: CPT

## 2022-08-30 PROCEDURE — 6370000000 HC RX 637 (ALT 250 FOR IP): Performed by: STUDENT IN AN ORGANIZED HEALTH CARE EDUCATION/TRAINING PROGRAM

## 2022-08-30 PROCEDURE — 80143 DRUG ASSAY ACETAMINOPHEN: CPT

## 2022-08-30 PROCEDURE — 80307 DRUG TEST PRSMV CHEM ANLYZR: CPT

## 2022-08-30 PROCEDURE — 99284 EMERGENCY DEPT VISIT MOD MDM: CPT

## 2022-08-30 PROCEDURE — 83735 ASSAY OF MAGNESIUM: CPT

## 2022-08-30 PROCEDURE — 80179 DRUG ASSAY SALICYLATE: CPT

## 2022-08-30 PROCEDURE — 6360000002 HC RX W HCPCS: Performed by: STUDENT IN AN ORGANIZED HEALTH CARE EDUCATION/TRAINING PROGRAM

## 2022-08-30 PROCEDURE — 84100 ASSAY OF PHOSPHORUS: CPT

## 2022-08-30 PROCEDURE — 96374 THER/PROPH/DIAG INJ IV PUSH: CPT

## 2022-08-30 PROCEDURE — 80053 COMPREHEN METABOLIC PANEL: CPT

## 2022-08-30 PROCEDURE — 82077 ASSAY SPEC XCP UR&BREATH IA: CPT

## 2022-08-30 PROCEDURE — 83880 ASSAY OF NATRIURETIC PEPTIDE: CPT

## 2022-08-30 RX ORDER — CEFDINIR 300 MG/1
300 CAPSULE ORAL 2 TIMES DAILY
Qty: 14 CAPSULE | Refills: 0 | Status: SHIPPED | OUTPATIENT
Start: 2022-08-30 | End: 2022-09-06

## 2022-08-30 RX ORDER — FLUCONAZOLE 100 MG/1
200 TABLET ORAL ONCE
Status: COMPLETED | OUTPATIENT
Start: 2022-08-30 | End: 2022-08-30

## 2022-08-30 RX ADMIN — CEFTRIAXONE 1000 MG: 1 INJECTION, POWDER, FOR SOLUTION INTRAMUSCULAR; INTRAVENOUS at 05:40

## 2022-08-30 RX ADMIN — FLUCONAZOLE 200 MG: 100 TABLET ORAL at 05:42

## 2022-08-30 ASSESSMENT — PAIN DESCRIPTION - LOCATION: LOCATION: ABDOMEN

## 2022-08-30 ASSESSMENT — ENCOUNTER SYMPTOMS
CONSTIPATION: 0
DIARRHEA: 0
TROUBLE SWALLOWING: 0
COUGH: 0
BACK PAIN: 0
SHORTNESS OF BREATH: 0
RECTAL PAIN: 0
COLOR CHANGE: 0
VOMITING: 0
ABDOMINAL PAIN: 0
WHEEZING: 0
VOICE CHANGE: 0
NAUSEA: 0

## 2022-08-30 ASSESSMENT — PAIN SCALES - WONG BAKER: WONGBAKER_NUMERICALRESPONSE: 8

## 2022-08-30 ASSESSMENT — PAIN DESCRIPTION - ORIENTATION: ORIENTATION: MID;LOWER

## 2022-08-30 ASSESSMENT — PAIN - FUNCTIONAL ASSESSMENT
PAIN_FUNCTIONAL_ASSESSMENT: WONG-BAKER FACES
PAIN_FUNCTIONAL_ASSESSMENT: NONE - DENIES PAIN

## 2022-08-30 ASSESSMENT — PAIN DESCRIPTION - FREQUENCY: FREQUENCY: CONTINUOUS

## 2022-08-30 NOTE — ED NOTES
Patient verbalized understanding of dc and follow up orders as well as medication administration and education on importance of finishing all prescribed abx. Patient ambulated and walked out of ed, no further questions, nad.       Re Jones RN  08/30/22 [id number]
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
## 2022-09-08 ENCOUNTER — APPOINTMENT (OUTPATIENT)
Dept: GENERAL RADIOLOGY | Age: 87
End: 2022-09-08
Payer: MEDICARE

## 2022-09-08 ENCOUNTER — HOSPITAL ENCOUNTER (EMERGENCY)
Age: 87
Discharge: HOME | End: 2022-09-08
Attending: EMERGENCY MEDICINE
Payer: MEDICARE

## 2022-09-08 ENCOUNTER — APPOINTMENT (OUTPATIENT)
Dept: CT IMAGING | Age: 87
End: 2022-09-08
Payer: MEDICARE

## 2022-09-08 VITALS
WEIGHT: 200 LBS | OXYGEN SATURATION: 99 % | SYSTOLIC BLOOD PRESSURE: 116 MMHG | BODY MASS INDEX: 26.51 KG/M2 | HEIGHT: 73 IN | HEART RATE: 80 BPM | DIASTOLIC BLOOD PRESSURE: 74 MMHG | TEMPERATURE: 98.5 F | RESPIRATION RATE: 16 BRPM

## 2022-09-08 DIAGNOSIS — R55 SYNCOPE AND COLLAPSE: Primary | ICD-10-CM

## 2022-09-08 LAB
ANION GAP SERPL CALCULATED.3IONS-SCNC: 6 MMOL/L (ref 7–16)
BACTERIA: ABNORMAL /HPF
BASOPHILS ABSOLUTE: 0.05 E9/L (ref 0–0.2)
BASOPHILS RELATIVE PERCENT: 0.7 % (ref 0–2)
BILIRUBIN URINE: NEGATIVE
BLOOD, URINE: NORMAL
BUN BLDV-MCNC: 16 MG/DL (ref 6–23)
CALCIUM SERPL-MCNC: 8.5 MG/DL (ref 8.6–10.2)
CHLORIDE BLD-SCNC: 104 MMOL/L (ref 98–107)
CLARITY: NORMAL
CO2: 27 MMOL/L (ref 22–29)
COLOR: YELLOW
CREAT SERPL-MCNC: 1 MG/DL (ref 0.7–1.2)
CRYSTALS, UA: ABNORMAL /HPF
EOSINOPHILS ABSOLUTE: 0.08 E9/L (ref 0.05–0.5)
EOSINOPHILS RELATIVE PERCENT: 1.2 % (ref 0–6)
GFR AFRICAN AMERICAN: >60
GFR NON-AFRICAN AMERICAN: >60 ML/MIN/1.73
GLUCOSE BLD-MCNC: 189 MG/DL (ref 74–99)
GLUCOSE URINE: NEGATIVE MG/DL
HCT VFR BLD CALC: 29.8 % (ref 37–54)
HEMOGLOBIN: 9.2 G/DL (ref 12.5–16.5)
IMMATURE GRANULOCYTES #: 0.05 E9/L
IMMATURE GRANULOCYTES %: 0.7 % (ref 0–5)
KETONES, URINE: NEGATIVE MG/DL
LACTIC ACID, SEPSIS: 1.2 MMOL/L (ref 0.5–1.9)
LACTIC ACID, SEPSIS: 1.8 MMOL/L (ref 0.5–1.9)
LEUKOCYTE ESTERASE, URINE: NEGATIVE
LYMPHOCYTES ABSOLUTE: 1.09 E9/L (ref 1.5–4)
LYMPHOCYTES RELATIVE PERCENT: 15.8 % (ref 20–42)
MCH RBC QN AUTO: 25.9 PG (ref 26–35)
MCHC RBC AUTO-ENTMCNC: 30.9 % (ref 32–34.5)
MCV RBC AUTO: 83.9 FL (ref 80–99.9)
MONOCYTES ABSOLUTE: 0.36 E9/L (ref 0.1–0.95)
MONOCYTES RELATIVE PERCENT: 5.2 % (ref 2–12)
NEUTROPHILS ABSOLUTE: 5.26 E9/L (ref 1.8–7.3)
NEUTROPHILS RELATIVE PERCENT: 76.4 % (ref 43–80)
NITRITE, URINE: NEGATIVE
PDW BLD-RTO: 15.9 FL (ref 11.5–15)
PH UA: 6 (ref 5–9)
PLATELET # BLD: 239 E9/L (ref 130–450)
PMV BLD AUTO: 9.9 FL (ref 7–12)
POTASSIUM SERPL-SCNC: 4 MMOL/L (ref 3.5–5)
PROTEIN UA: NEGATIVE MG/DL
RBC # BLD: 3.55 E12/L (ref 3.8–5.8)
RBC UA: ABNORMAL /HPF (ref 0–2)
SODIUM BLD-SCNC: 137 MMOL/L (ref 132–146)
SPECIFIC GRAVITY UA: 1.02 (ref 1–1.03)
TROPONIN, HIGH SENSITIVITY: 31 NG/L (ref 0–11)
TROPONIN, HIGH SENSITIVITY: 39 NG/L (ref 0–11)
UROBILINOGEN, URINE: 0.2 E.U./DL
WBC # BLD: 6.9 E9/L (ref 4.5–11.5)
WBC UA: ABNORMAL /HPF (ref 0–5)

## 2022-09-08 PROCEDURE — 93005 ELECTROCARDIOGRAM TRACING: CPT | Performed by: EMERGENCY MEDICINE

## 2022-09-08 PROCEDURE — 84484 ASSAY OF TROPONIN QUANT: CPT

## 2022-09-08 PROCEDURE — 71045 X-RAY EXAM CHEST 1 VIEW: CPT

## 2022-09-08 PROCEDURE — 99285 EMERGENCY DEPT VISIT HI MDM: CPT

## 2022-09-08 PROCEDURE — 85025 COMPLETE CBC W/AUTO DIFF WBC: CPT

## 2022-09-08 PROCEDURE — 36415 COLL VENOUS BLD VENIPUNCTURE: CPT

## 2022-09-08 PROCEDURE — 83605 ASSAY OF LACTIC ACID: CPT

## 2022-09-08 PROCEDURE — 81001 URINALYSIS AUTO W/SCOPE: CPT

## 2022-09-08 PROCEDURE — 80048 BASIC METABOLIC PNL TOTAL CA: CPT

## 2022-09-08 PROCEDURE — 70450 CT HEAD/BRAIN W/O DYE: CPT

## 2022-09-08 ASSESSMENT — ENCOUNTER SYMPTOMS
VOMITING: 0
DIARRHEA: 0
NAUSEA: 0
ABDOMINAL PAIN: 0
SINUS PRESSURE: 0
SORE THROAT: 0
EYE DISCHARGE: 0
WHEEZING: 0
EYE PAIN: 0
SHORTNESS OF BREATH: 0
EYE REDNESS: 0
DIFFICULTY BREATHING: 0
BACK PAIN: 0
VISUAL CHANGE: 0
COUGH: 0

## 2022-09-08 ASSESSMENT — PAIN - FUNCTIONAL ASSESSMENT
PAIN_FUNCTIONAL_ASSESSMENT: NONE - DENIES PAIN
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
## 2022-09-08 NOTE — ED NOTES
Report from [place], [address] [place] [address]. Care of patient acquired at this time.      Zofia Rhodes RN  09/08/22 [id number]

## 2022-09-09 LAB
EKG ATRIAL RATE: 89 BPM
EKG P AXIS: 108 DEGREES
EKG P-R INTERVAL: 194 MS
EKG Q-T INTERVAL: 402 MS
EKG QRS DURATION: 90 MS
EKG QTC CALCULATION (BAZETT): 489 MS
EKG R AXIS: 27 DEGREES
EKG T AXIS: 26 DEGREES
EKG VENTRICULAR RATE: 89 BPM

## 2022-09-20 ENCOUNTER — OFFICE VISIT (OUTPATIENT)
Dept: FAMILY MEDICINE CLINIC | Age: 87
End: 2022-09-20
Payer: COMMERCIAL

## 2022-09-20 VITALS
WEIGHT: 148.8 LBS | RESPIRATION RATE: 20 BRPM | HEIGHT: 73 IN | OXYGEN SATURATION: 97 % | SYSTOLIC BLOOD PRESSURE: 109 MMHG | DIASTOLIC BLOOD PRESSURE: 71 MMHG | HEART RATE: 92 BPM | BODY MASS INDEX: 19.72 KG/M2 | TEMPERATURE: 97.2 F

## 2022-09-20 DIAGNOSIS — Z85.46 PERSONAL HISTORY OF MALIGNANT NEOPLASM OF PROSTATE: ICD-10-CM

## 2022-09-20 DIAGNOSIS — G30.1 LATE ONSET ALZHEIMER'S DISEASE WITHOUT BEHAVIORAL DISTURBANCE (HCC): ICD-10-CM

## 2022-09-20 DIAGNOSIS — I25.10 CORONARY ARTERY DISEASE INVOLVING NATIVE CORONARY ARTERY OF NATIVE HEART WITHOUT ANGINA PECTORIS: ICD-10-CM

## 2022-09-20 DIAGNOSIS — I10 PRIMARY HYPERTENSION: ICD-10-CM

## 2022-09-20 DIAGNOSIS — Z13.29 SCREENING FOR THYROID DISORDER: ICD-10-CM

## 2022-09-20 DIAGNOSIS — Z13.21 ENCOUNTER FOR VITAMIN DEFICIENCY SCREENING: ICD-10-CM

## 2022-09-20 DIAGNOSIS — R73.9 HYPERGLYCEMIA: ICD-10-CM

## 2022-09-20 DIAGNOSIS — Z76.89 ENCOUNTER TO ESTABLISH CARE: Primary | ICD-10-CM

## 2022-09-20 DIAGNOSIS — F02.80 LATE ONSET ALZHEIMER'S DISEASE WITHOUT BEHAVIORAL DISTURBANCE (HCC): ICD-10-CM

## 2022-09-20 PROCEDURE — 1123F ACP DISCUSS/DSCN MKR DOCD: CPT | Performed by: NEUROMUSCULOSKELETAL MEDICINE & OMM

## 2022-09-20 PROCEDURE — 99203 OFFICE O/P NEW LOW 30 MIN: CPT | Performed by: NEUROMUSCULOSKELETAL MEDICINE & OMM

## 2022-09-20 SDOH — ECONOMIC STABILITY: FOOD INSECURITY: WITHIN THE PAST 12 MONTHS, YOU WORRIED THAT YOUR FOOD WOULD RUN OUT BEFORE YOU GOT MONEY TO BUY MORE.: NEVER TRUE

## 2022-09-20 SDOH — ECONOMIC STABILITY: FOOD INSECURITY: WITHIN THE PAST 12 MONTHS, THE FOOD YOU BOUGHT JUST DIDN'T LAST AND YOU DIDN'T HAVE MONEY TO GET MORE.: NEVER TRUE

## 2022-09-20 ASSESSMENT — ENCOUNTER SYMPTOMS
COUGH: 0
BACK PAIN: 1
STRIDOR: 0
CHOKING: 0
SHORTNESS OF BREATH: 0
WHEEZING: 0
CHEST TIGHTNESS: 0

## 2022-09-20 ASSESSMENT — PATIENT HEALTH QUESTIONNAIRE - PHQ9
1. LITTLE INTEREST OR PLEASURE IN DOING THINGS: 0
SUM OF ALL RESPONSES TO PHQ QUESTIONS 1-9: 1
2. FEELING DOWN, DEPRESSED OR HOPELESS: 1
SUM OF ALL RESPONSES TO PHQ QUESTIONS 1-9: 1
SUM OF ALL RESPONSES TO PHQ9 QUESTIONS 1 & 2: 1

## 2022-09-20 ASSESSMENT — LIFESTYLE VARIABLES
HOW MANY STANDARD DRINKS CONTAINING ALCOHOL DO YOU HAVE ON A TYPICAL DAY: PATIENT DOES NOT DRINK
HOW OFTEN DO YOU HAVE A DRINK CONTAINING ALCOHOL: NEVER

## 2022-09-20 ASSESSMENT — SOCIAL DETERMINANTS OF HEALTH (SDOH): HOW HARD IS IT FOR YOU TO PAY FOR THE VERY BASICS LIKE FOOD, HOUSING, MEDICAL CARE, AND HEATING?: NOT HARD AT ALL

## 2022-09-20 NOTE — PROGRESS NOTES
Meliton Chacon (:  1934) is a 80 y.o. male,New patientNew patient, here for evaluation of the following chief complaint(s):  Established New Doctor (Prev PCP Riky Corey wanted him seen since he has not seen PCP in many years/) and Hypertension         ASSESSMENT/PLAN:  1. Encounter to establish care  2. Primary hypertension  Assessment & Plan:  Patient no longer taking blood pressure medications and blood pressure today at the office was 109/71. His BMI is 19.63. Orders:  -     CBC with Auto Differential; Future  3. Coronary artery disease involving native coronary artery of native heart without angina pectoris  Assessment & Plan:   He has not seen cardiology in quite a number of years he used to see Dr. Jimena Bedolla does have a history of bypass surgery I believe with stent placements. 4. Personal history of malignant neoplasm of prostate   Assessment & Plan:   Patient being seen and followed by urology, Dr. Laura Wolf.  5. Late onset Alzheimer's disease without behavioral disturbance [facility])  Assessment & Plan:   Patient does have some mild to moderate dementia he does know the year and who the president is. Refusing at this time any medications. 6. Hyperglycemia  -     Comprehensive Metabolic Panel; Future  -     Hemoglobin A1C; Future  -     Lipid Panel; Future  7. Screening for thyroid disorder  -     TSH; Future  8. Encounter for vitamin deficiency screening  -     Vitamin D 25 Hydroxy; Future      Return in about 6 months (around 3/20/2023). Subjective   SUBJECTIVE/OBJECTIVE:  HPI 49-year-old male here to establish care. He comes in with his son they live together in a 2 bedroom apartment for the last 5 to 6 years. Patient has a history of hypertension, coronary artery disease with bypass surgery, late onset Alzheimer's dementia, history of prostate CA being seen and followed by Dr. Shruti Wood.     He comes in at the advice of his urologist to be become established with a primary care physician. Patient was told that he could stop his blood pressure medication and aspirin. Patient had seen [facility] from what the sinus told me approximately 4 to 5 years ago. Patient's only prescription medication listed now is Flomax 0.4 mg 1 daily. Blood pressure in the office today 109/71 his weight is 148 pounds with a BMI of 19.63. His appetite has been somewhat decreased recently. There is a discrepancy from the ER weight which shows him about 3 weeks ago at 200 pounds here in the office is 148. He was seen in the ER for syncope and collapse while at the urologist office. Son says it was very hot in the office patient did not experience any chest pain shortness of breath work-up in the ER which was reviewed was unremarkable. Including EKG, CAT scan of the head and multiple lab work-up. Was slightly anemic with a hemoglobin of 9.2. I will get fasting blood work as an outpatient including CBC, CMP, hemoglobin A1c, lipid panel, vitamin D level, and TSH. Review of Systems   Constitutional:  Positive for appetite change (appetite has been decreased for about the last year. ). Negative for activity change, chills, diaphoresis, fatigue, fever and unexpected weight change. Respiratory:  Negative for cough, choking, chest tightness, shortness of breath, wheezing and stridor. Cardiovascular:  Negative for chest pain, palpitations and leg swelling. Musculoskeletal:  Positive for back pain (back injuries from years ago.) and neck pain (work related injuries from years ago. ). Negative for arthralgias and gait problem (no falls at home. he rolls off his bed frequently. ). Neurological:  Positive for weakness (weakness in his bilateral arms and legs. they just \"ache\" a little bit.). Negative for dizziness, light-headedness and headaches. Psychiatric/Behavioral:  Negative for sleep disturbance.          Objective   /71 (Site: Right Upper Arm, Position: Sitting, Cuff Size: both ears 2/13/2018        Past Surgical History:   Procedure Laterality Date    APPENDECTOMY      CORONARY ARTERY BYPASS GRAFT  5/3/1991    [address]:  AC - SVT to RCA, LIMA to LAD. DIAGNOSTIC CARDIAC CATH LAB PROCEDURE  4/9/1991    [address]  CAD. DIAGNOSTIC CARDIAC CATH LAB PROCEDURE  2/10/1999    [address] DIAGNOSTIC CARDIAC CATH LAB PROCEDURE  11/18/2005    NS: Dr. John Sosa CATH LAB PROCEDURE  2/7/2006    With abdominal aortogram.    DIAGNOSTIC CARDIAC CATH LAB PROCEDURE  2/11/2009    [facility]. LUMBAR DISCECTOMY      THROMBECTOMY  11/18/2005    Exploration/thrombectomy of right common femoral and right popliteal arteries. The ASCVD Risk score (Alexander Almanzar, et al., 2013) failed to calculate for the following reasons: The 2013 ASCVD risk score is only valid for ages 36 to 78     Educational materials and/or home exercises printed for patient's review and were included inpatient instructions on his/her After Visit Summary and given to patient at the end of visit.     regarding above diagnosis, including possible risks and complications,  especially if left uncontrolled. Counseled regarding the possible side effects, risks, benefits and alternatives to treatment; patient and/or guardian verbalizes understanding, agrees, feels comfortable with and wishes to proceed withabove treatment plan. Advised patient to call with any new medication issues, and read all Rx infofrom pharmacy to assure aware of all possible risks and side effects of medication before taking. age and gender appropriate health screening exams and vaccinations. Advised patient regarding importance of keeping up with recommended health maintenance and to schedule as soon as possible if overdue, as this isimportant in assessing for undiagnosed pathology, especially cancer, as well as protecting against potentially harmful/life threatening disease.           Patient and/or guardian verbalizes understanding andagrees with above counseling, assessment and plan. All questions answered. An electronic signature was used to authenticate this note.     --Mine Marcano, DO

## 2022-09-20 NOTE — ASSESSMENT & PLAN NOTE
Patient no longer taking blood pressure medications and blood pressure today at the office was 109/71. His BMI is 19.63.

## 2022-09-20 NOTE — ASSESSMENT & PLAN NOTE
Patient does have some mild to moderate dementia he does know the year and who the president is. Refusing at this time any medications.

## 2022-09-20 NOTE — ASSESSMENT & PLAN NOTE
He has not seen cardiology in quite a number of years he used to see Dr. Vinita Tran does have a history of bypass surgery I believe with stent placements.

## 2022-12-20 NOTE — TELEPHONE ENCOUNTER
Pt needs referral for hospital bed in home and for home health care. Please call Kathe Blankenship [phone]. Would like to have this weekend.     Moving from one home to another

## 2022-12-22 PROBLEM — A41.9 SEPTIC SHOCK (HCC): Status: ACTIVE | Noted: 2022-01-01

## 2022-12-22 PROBLEM — R65.21 SEPTIC SHOCK (HCC): Status: ACTIVE | Noted: 2022-01-01

## 2022-12-22 NOTE — TELEPHONE ENCOUNTER
Please refer to [facility]  #-[phone] , and [facility] #[phone]    Second option if [facility] durable equipment is unable to provide hospital bed  please send [address] equipment # [phone]  Family needs these things done asap pt will be released from hospital tomorrow need soon as possible

## 2022-12-22 NOTE — ED NOTES
Name: Sasha Fletcher  : 1934  MRN: [id number]    Date: 2022    Benefits of immediately proceeding with Radiology exam outweigh the risks and therefore the following is being waived:      [] Pregnancy test    [] Protocol for Iodine allergy    [] MRI questionnaire    [x] BUN/Creatinine        MD Enrique Campos MD  [id number]

## 2022-12-22 NOTE — ED PROVIDER NOTES
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 ([facility]    2. Pneumonia of both lungs due to infectious organism, unspecified part of lung    3. Rhinovirus    4. Urinary tract infection without hematuria, site unspecified    5. SHMUEL (acute kidney injury) ([facility]    6. Failure to thrive in adult          Disposition  Patient's disposition: Admit to CCU/ICU  Patient's condition is stable.           Jake Maddox MD  Resident  12/23/22 7595
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
## 2022-12-23 NOTE — ACP (ADVANCE CARE PLANNING)
Advance Care Planning   Healthcare Decision Maker:    Primary Decision Maker: Alycia Garcia Child - [phone]    Secondary Decision Maker: Sanjuana Garcia Child - [phone]    Secondary Decision Maker: Rosemary العلي Child - [phone]    Click here to complete Healthcare Decision Makers including selection of the Healthcare Decision Maker Relationship (ie \"Primary\").
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
## 2022-12-23 NOTE — PROGRESS NOTES
Pharmacy Consultation Note  (Antibiotic Dosing and Monitoring)    Initial consult date: 12/23/22  Consulting physician/provider: Dr. Gadiel Rodas  Drug: Vancomycin  Indication: Empiric    Age/  Gender Height Weight IBW  Allergy Information   88 y.o./male 6' (182.9 cm) 125 lb (56.7 kg)     Ideal body weight: 77.6 kg (171 lb 1.2 oz)   Patient has no known allergies. Renal Function:  Recent Labs     12/22/22  1512   BUN 77*   CREATININE 1.7*       Intake/Output Summary (Last 24 hours) at 12/23/2022 0828  Last data filed at 12/22/2022 1756  Gross per 24 hour   Intake 2100 ml   Output --   Net 2100 ml       Vancomycin Monitoring:  Trough:  No results for input(s): VANCOTROUGH in the last 72 hours. Random:  No results for input(s): VANCORANDOM in the last 72 hours. No results for input(s): Letta Banco in the last 72 hours. Historical Cultures:  Organism   Date Value Ref Range Status   08/18/2022 Proteus mirabilis (A)  Final     No results for input(s): BC in the last 72 hours. Vancomycin Administration Times:  Recent vancomycin administrations        No vancomycin IV orders with administrations found. Assessment:  Patient is a 80 y.o. male who has been initiated on vancomycin  Estimated Creatinine Clearance: 24 mL/min (A) (based on SCr of 1.7 mg/dL (H)). Plan:   Will continue vancomycin 1000 mg IV once  Will check vancomycin levels when appropriate  Will continue to monitor renal function   Pharmacy to follow      Hi Epperson PharmD, BCPS, BCCCP 12/23/2022 8:29 AM

## 2022-12-23 NOTE — PROGRESS NOTES
Hospitalist Progress Note      SYNOPSIS: Patient admitted on 2022 for Septic shock ([facility] [address]  Hilda Paige is a 80 y.o. y/o male with a history of coronary artery disease, Alzheimer's dementia, seizure disorder, hypertension, prostate cancer  who presented to [facility]) on 2022 with hypotension and failure to thrive. UA concerning for UTI. CT of the chest showed extensive lymphadenopathy of the supraclavicular region, mediastinum and hilum concerning for malignancy and widespread bone disease, also infiltrates in the bilateral lower lobes and left upper lobe concerning for pneumonia. CT of the abdomen showed widespread extensive lymphadenopathy in the abdomen and pelvis concerning for widespread malignancy and also showed the diffuse bone metastatic disease. He was also found to have an acute kidney injury. He was admitted to the ICU on vasopressors. SUBJECTIVE:  Patient is seen laying comfortably in bed, son is at bedside. No new complaints today. Temp (24hrs), Av.5 °F (36.9 °C), Min:94.2 °F (34.6 °C), Max:99.5 °F (37.5 °C)    DIET: Diet NPO  CODE: Limited    Intake/Output Summary (Last 24 hours) at 2022 1258  Last data filed at 2022 1100  Gross per 24 hour   Intake 2100 ml   Output 150 ml   Net 1950 ml       OBJECTIVE:    /68   Pulse (!) 110   Temp 99 °F (37.2 °C) (Bladder)   Resp 22   Ht 6' (1.829 m)   Wt 125 lb (56.7 kg)   SpO2 95%   BMI 16.95 kg/m²     General appearance: No apparent distress, appears stated age and cooperative. HEENT:  Conjunctivae/corneas clear. Neck: Supple. No jugular venous distention. Respiratory: diminished to auscultation bilaterally, normal respiratory effort  Cardiovascular: Regular rate rhythm, normal S1-S2  Abdomen: Soft, nontender, nondistended  Musculoskeletal: No clubbing, cyanosis, no bilateral lower extremity edema. Brisk capillary refill.    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 Acute toxic metabolic encephalopathy  Atrial fibrillation  Anemia of chronic disease       PLAN:  Clinical care is being made by the critical care team.  Patient remains hypotensive on pressors. Continue broad-spectrum IV antibiotics  Follow cultures. Oncology consulted regarding suspected malignancy with metastasis. Continue GI and DVT prophylaxis. DISPOSITION: Potential plan is for skilled nursing facility posthospitalization. Timing will depend on rate of clinical improvement.       Medications:  REVIEWED DAILY    Infusion Medications    vasopressin (Septic Shock) infusion Stopped (12/23/22 0805)    sodium chloride      norepinephrine 58 mcg/min (12/23/22 1010)    sodium chloride 150 mL/hr at 12/23/22 1122     Scheduled Medications    hydrocortisone sodium succinate PF  50 mg IntraVENous Q8H    cefepime  1,000 mg IntraVENous Q12H    pantoprazole (PROTONIX) 40 mg injection  40 mg IntraVENous Daily    heparin (porcine)  5,000 Units SubCUTAneous Q8H    sodium chloride flush  5-40 mL IntraVENous 2 times per day    ipratropium-albuterol  1 ampule Inhalation Q4H WA    vancomycin (VANCOCIN) intermittent dosing (placeholder)   Other RX Placeholder     PRN Meds: sodium chloride flush, sodium chloride, ondansetron **OR** ondansetron, polyethylene glycol, acetaminophen **OR** acetaminophen, morphine    Labs:     Recent Labs     12/22/22  1512 12/23/22  0921   WBC 4.5 8.0   HGB 8.3* 8.0*   HCT 26.1* 26.2*   * 111*       Recent Labs     12/22/22  1512 12/22/22  1841 12/23/22  0657 12/23/22  0921   *  --   --  130*   K 5.9* 4.4 4.98 5.3*   CL 97*  --   --  100   CO2 17*  --   --  11*   BUN 77*  --   --  73* CREATININE 1.7*  --   --  1.7*   CALCIUM 9.8  --   --  9.5   PHOS  --   --   --  4.5       Recent Labs     12/22/22  1512 12/23/22  0921   PROT 5.7* 5.5*   ALKPHOS 630* 610*   ALT 6 8   AST 13 19   BILITOT 0.5 0.4       No results for input(s): INR in the last 72 hours. No results for input(s): Laurenmj Longs in the last 72 hours. Chronic labs:    Lab Results   Component Value Date    CHOL 163 05/15/2019    TRIG 82 05/15/2019    HDL 59 05/15/2019    LDLCALC 88 05/15/2019    TSH 1.210 09/04/2019    PSA 12.68 (H) 06/26/2020    INR 1.1 04/14/2019    LABA1C 5.8 11/06/2015       Radiology: REVIEWED DAILY    +++++++++++++++++++++++++++++++++++++++++++++++++  Suad Rajput MD  [address]  +++++++++++++++++++++++++++++++++++++++++++++++++  NOTE: This report was transcribed using voice recognition software. Every effort was made to ensure accuracy; however, inadvertent computerized transcription errors may be present.

## 2022-12-23 NOTE — CONSULTS
Palliative Care Department  [phone]  Palliative Care Initial Consult  Provider AMERICO Fregoso CNP      PATIENT: Nelly Henning  : 1934  MRN: [id number]  ADMISSION DATE: 2022  2:56 PM  Referring Provider: Patric Salazar MD    Palliative Medicine was consulted on hospital day 1 for assistance with Goals of care    HPI:     Greer Andre is a 80 y.o. y/o male with a history of coronary artery disease, Alzheimer's dementia, seizure disorder, hypertension, prostate cancer  who presented to [facility]) on 2022 with hypotension and failure to thrive. UA concerning for UTI. CT of the chest showed extensive lymphadenopathy of the supraclavicular region, mediastinum and hilum concerning for malignancy and widespread bone disease, also infiltrates in the bilateral lower lobes and left upper lobe concerning for pneumonia. CT of the abdomen showed widespread extensive lymphadenopathy in the abdomen and pelvis concerning for widespread malignancy and also showed the diffuse bone metastatic disease. He was also found to have an acute kidney injury. He was admitted to the ICU on vasopressors. ASSESSMENT/PLAN:     Pertinent Hospital Diagnoses     Acute kidney injury  Septic shock  UTI  Likely metastatic cancer    Palliative Care Encounter / Counseling Regarding Goals of Care  Please see detailed goals of care discussion as below  At this time, Nelly Henning, Does Not have capacity for medical decision-making. Capacity is time limited and situation/question specific  During encounter Jameson Scruggs was surrogate medical decision-maker  Outcome of goals of care meeting: Transition to comfort care and stop pressors when other family members arrive.    Code status DNR-CC  Advanced Directives: no POA or living will in [facility]  Surrogate/Legal NOK:  Paola Schwarz (son) [phone]  Kalli Joseph (son) [phone]  Randa Cool (son) [phone]    Spiritual assessment: no spiritual transcription errors may be present.

## 2022-12-23 NOTE — PLAN OF CARE
Problem: Skin/Tissue Integrity  Goal: Absence of new skin breakdown  Description: 1. Monitor for areas of redness and/or skin breakdown  2. Assess vascular access sites hourly  3. Every 4-6 hours minimum:  Change oxygen saturation probe site  4. Every 4-6 hours:  If on nasal continuous positive airway pressure, respiratory therapy assess nares and determine need for appliance change or resting period.   Outcome: Progressing     Problem: Discharge Planning  Goal: Discharge to home or other facility with appropriate resources  Outcome: Progressing  Flowsheets (Taken 12/23/2022 1600)  Discharge to home or other facility with appropriate resources: Identify barriers to discharge with patient and caregiver     Problem: Pain  Goal: Verbalizes/displays adequate comfort level or baseline comfort level  Outcome: Progressing  Flowsheets (Taken 12/23/2022 1600)  Verbalizes/displays adequate comfort level or baseline comfort level: Encourage patient to monitor pain and request assistance     Problem: ABCDS Injury Assessment  Goal: Absence of physical injury  Outcome: Progressing  Flowsheets (Taken 12/23/2022 1600)  Absence of Physical Injury: Implement safety measures based on patient assessment     Problem: Safety - Adult  Goal: Free from fall injury  Outcome: Progressing  Flowsheets (Taken 12/23/2022 1600)  Free From Fall Injury: Instruct family/caregiver on patient safety

## 2022-12-23 NOTE — PROGRESS NOTES
Palliative Medicine Social Work     Patient Name: Ayush Monge    Age: 80 y.o. Marital Status:     [place] Status: unknown    Next of Kin: eldest son Estella Aquino [phone], or son Inge Carr [phone], Son with whom he lived [address] [phone]                Minor Children: no    Advanced Directives: no, sons Tushar Mohs and Carmen Gomez present and making joint decisions. They will update their brother Yariel Conn Code Status: limited, no to all. Current Goals of Care: transition to comfort care once family has visited  [facility] History: no    Substance Abuse:no    Indications of Abuse/Neglect: home in deplorable condition per ems and per family present. Pt was living with son David Delgado but he would not let Carmen Gomez or Tushar Bronsons into the house so they did not know how bad it was. Financial Concerns: no    Living Situation: home in deplorable condition per ems and per family present. Pt was living with son David Delgado but he would not let Carmen Gomez or Tushar Aiyanas into the house so they did not know how bad it was. Carmen Gomez and Aurelio Mohs were discussing pt going home with Tsuhar Aiyanas, however pts prognosis is terminal and this may not be an option. Assessment: 79 yo  male with history of dementia and prostate cancer admitted from home after his son Carmen Gomez called ems. He lives with his son David Delgado, and per Carmen Gomez had not seen a physician until recently. They were also trying to get hhc arranged. Pt has a history of prostate canecr, icu physician updated family on what appears to be metastatic disease. They are not surprised given pts decline. They are agreeable to transition to comfort care only once all family has been able to visit. Emotional support provided.

## 2022-12-23 NOTE — ED NOTES
N2N given to RN on 4400.  Transported by this RN without incident      Enma Dubose RN  12/23/22 5232

## 2022-12-23 NOTE — TELEPHONE ENCOUNTER
Just faxed prescription for hospital bed to [facility] ([facility]) durable equipment at area code [phone].

## 2022-12-23 NOTE — H&P
Hospital Medicine History & Physical      PCP: Betzy Henry DO    Date of Admission: 12/22/2022    Date of Service: Pt seen/examined on 12/22/22 and Admitted to Inpatient with expected LOS greater than two midnights due to medical therapy. Chief Complaint:  hypotension      History Of Present Illness:   80-year-old male past medical history of coronary artery disease, Alzheimer's dementia, seizure disorder, hypertension, prostate cancer with no close follow-up presents after being found by EMS in deplorable conditions at his son's house. He was found to be hypotensive and presented to ER I will 63/38. Blood pressure did not improve with IV fluids resuscitation given dizziness started on Levophed. Urinalysis was concerning for urinary tract infection with motor leukocyte esterase packed white blood cells and many bacteria. Lactic acidosis of 2.9 lab work with sodium 128 potassium 5.9 creatinine 1.7 CO2 of 17 anion gap 14. Chest x-ray showed left midlung and right lateral lung base pneumonia. EKG atrial fibrillation heart rate 89. He was given Rocephin and doxycycline in the ER. CT abdomen obtained showed concern for widespread extensive lymphadenopathy in the abdomen and pelvis concerning for widespread malignancy, metastatic lymphoma. Also diffuse bone metastatic disease. CT chest showed extensive lymphadenopathy, supraclavicular region, mediastinum and hilum concerning for malignancy admit status widespread bone disease. Also concern for infiltrates in the lower lobes bilaterally masslike infiltrate in the left upper lobe concerning for possible pneumonia. Patient is oriented to person only somewhat to place. Past Medical History:          Diagnosis Date    CAD (coronary artery disease)     Cancer ([address]     Chest pain 04/2002    Hospitalized at [facility]. Chronic back pain     Hyperlipidemia     Hypertension 1987    Industrial accident 1977    (Back, neck and head).     Lump Removed from elbow. MI (myocardial infarction) ([address]     Anterior apical.    MI (myocardial infarction) ([facility] [address] 02/10/1999    Acute inferior wall MI.    Seizure disorder ([facility] [address]     Status post insertion of iliac artery stent 11/1/82005    Syncope 09/1998    Tinnitus of both ears 02/13/2018       Past Surgical History:          Procedure Laterality Date    APPENDECTOMY      CORONARY ARTERY BYPASS GRAFT  5/3/1991    [address]:  AC - SVT to RCA, LIMA to LAD. DIAGNOSTIC CARDIAC CATH LAB PROCEDURE  4/9/1991    [address]  CAD. DIAGNOSTIC CARDIAC CATH LAB PROCEDURE  2/10/1999    [address] DIAGNOSTIC CARDIAC CATH LAB PROCEDURE  11/18/2005    NS: Dr. Johnnie Womack CATH LAB PROCEDURE  2/7/2006    With abdominal aortogram.    DIAGNOSTIC CARDIAC CATH LAB PROCEDURE  2/11/2009    [facility]. LUMBAR DISCECTOMY      THROMBECTOMY  11/18/2005    Exploration/thrombectomy of right common femoral and right popliteal arteries. Medications Prior to Admission:      Prior to Admission medications    Medication Sig Start Date End Date Taking? Authorizing Provider   tamsulosin (FLOMAX) 0.4 MG capsule Take 1 capsule by mouth daily 8/21/22   Norman Nation MD       Allergies:  Patient has no known allergies. Social History:      The patient currently lives with his son    TOBACCO:   reports that he has never smoked. He has never used smokeless tobacco.  ETOH:   reports no history of alcohol use. Family History:       Reviewed in detail and negative for DM, CAD, Cancer, CVA. Positive as follows:        Problem Relation Age of Onset    Heart Disease Mother        REVIEW OF SYSTEMS:   Pertinent positives as noted in the HPI. All other systems reviewed and negative.     PHYSICAL EXAM:    BP (!) 88/57   Pulse (!) 104   Temp 98.4 °F (36.9 °C) (Bladder)   Resp 20   Ht 6' (1.829 m)   Wt 125 lb (56.7 kg)   SpO2 95%   BMI 16.95 kg/m²     General appearance:  No apparent distress, appears stated age and cooperative. HEENT:  Normal cephalic, atraumatic without obvious deformity. Pupils equal, round, and reactive to light. Extra ocular muscles intact. Conjunctivae/corneas clear. Neck: Supple, with full range of motion. No jugular venous distention. Trachea midline. Respiratory:  Normal respiratory effort. Clear to auscultation, bilaterally without Rales/Wheezes/Rhonchi. Cardiovascular:  Regular rate and rhythm with normal S1/S2 without murmurs, rubs or gallops. Abdomen: Soft, non-tender, non-distended with normal bowel sounds. Musculoskeletal:  No clubbing, cyanosis or edema bilaterally. Full range of motion without deformity. Skin: Skin color, texture, turgor normal.  No rashes or lesions. Neurologic:  Neurovascularly intact without any focal sensory/motor deficits. Cranial nerves: II-XII intact, grossly non-focal.  Psychiatric:  Alert and oriented to person and somewhat to place        Labs:     Recent Labs     12/22/22  1512   WBC 4.5   HGB 8.3*   HCT 26.1*   *     Recent Labs     12/22/22  1512   *   K 5.9*   CL 97*   CO2 17*   BUN 77*   CREATININE 1.7*   CALCIUM 9.8     Recent Labs     12/22/22  1512   AST 13   ALT 6   BILITOT 0.5   ALKPHOS 630*     No results for input(s): INR in the last 72 hours. No results for input(s): Paige [place] in the last 72 hours. Urinalysis:      Lab Results   Component Value Date/Time    NITRU Negative 12/22/2022 03:12 PM    [address] PACKED 12/22/2022 03:12 PM    BACTERIA MANY 12/22/2022 03:12 PM    RBCUA PACKED 12/22/2022 03:12 PM    BLOODU MODERATE 12/22/2022 03:12 PM    SPECGRAV 1.015 12/22/2022 03:12 PM    GLUCOSEU Negative 12/22/2022 03:12 PM         ASSESSMENT:  Sepsis  Urinary tract infection  Bilateral community-acquired pneumonia  Acute kidney injury  Metabolic encephalopathy   Metastatic disease with underlying prostate cancer      PLAN:  1. Continue pressors with Levophed. Admit to the ICU. Follow-up on blood cultures and urine culture. We will give antibiotics which doxycycline and Rocephin on admission. Defer further work-up and antibiotics management to ICU team.  Monitor kidney function. Palliative care consult. Hematology consult. Vivienne Aguiar MD    Thank you Loulou Loera DO for the opportunity to be involved in this patient's care. If you have any questions or concerns please feel free to contact me through [facility].

## 2022-12-24 NOTE — PROGRESS NOTES
Spoke with patient's family, they would like to do inpatient hospice care to avoid transporting the patient to another location. Call to Kavitha Willingham. Message left, on call nurse to call back.

## 2022-12-24 NOTE — PLAN OF CARE
Problem: Dyspnea Due to End of Life  Goal: Demonstrate understanding of and ability to manage respiratory symptoms at end of life  Description: Patient  and or family/caregiver will verbalize recall of breathing strategies to maintain an effective breathing pattern during the inpatient hospice stay. Outcome: Progressing     Problem: Communication Deficit  Goal: Effectively communicate symptoms, needs, and concerns  Description: Patient  and/or family/caregiver will be able to communicate symptoms, needs, and concerns as evidenced by the use of language services during the inpatient hospice stay.     Outcome: Progressing

## 2022-12-24 NOTE — PROGRESS NOTES
Patient's family all at the bedside, they spoke with Dr. Jose Dalal. Patient made DNR-CC. Orders discontinued. Please see active orders.

## 2022-12-24 NOTE — PLAN OF CARE
Problem: Skin/Tissue Integrity  Goal: Absence of new skin breakdown  Description: 1. Monitor for areas of redness and/or skin breakdown  2. Assess vascular access sites hourly  3. Every 4-6 hours minimum:  Change oxygen saturation probe site  4. Every 4-6 hours:  If on nasal continuous positive airway pressure, respiratory therapy assess nares and determine need for appliance change or resting period.   12/23/2022 2203 by Areli Velez RN  Outcome: Progressing     Problem: Discharge Planning  Goal: Discharge to home or other facility with appropriate resources  12/23/2022 2203 by Areli Velez RN  Outcome: Layne Paz (Taken 12/23/2022 2000)  Discharge to home or other facility with appropriate resources: Identify barriers to discharge with patient and caregiver     Problem: Pain  Goal: Verbalizes/displays adequate comfort level or baseline comfort level  12/23/2022 2203 by Areli Velez RN  Outcome: Progressing  Flowsheets (Taken 12/23/2022 2000)  Verbalizes/displays adequate comfort level or baseline comfort level: Encourage patient to monitor pain and request assistance     Problem: ABCDS Injury Assessment  Goal: Absence of physical injury  12/23/2022 2203 by Areli Velez RN  Outcome: Progressing  Flowsheets (Taken 12/23/2022 2000)  Absence of Physical Injury: Implement safety measures based on patient assessment     Problem: Safety - Adult  Goal: Free from fall injury  12/23/2022 2203 by Areli Velez RN  Outcome: Progressing  Flowsheets (Taken 12/23/2022 2000)  Free From Fall Injury: Instruct family/caregiver on patient safety

## 2022-12-24 NOTE — PLAN OF CARE
Problem: Skin/Tissue Integrity  Goal: Absence of new skin breakdown  Description: 1. Monitor for areas of redness and/or skin breakdown  2. Assess vascular access sites hourly  3. Every 4-6 hours minimum:  Change oxygen saturation probe site  4. Every 4-6 hours:  If on nasal continuous positive airway pressure, respiratory therapy assess nares and determine need for appliance change or resting period.   12/24/2022 0745 by Riki Kirk RN  Outcome: Progressing  12/23/2022 2203 by Marie Goodrich RN  Outcome: Progressing     Problem: Discharge Planning  Goal: Discharge to home or other facility with appropriate resources  12/24/2022 0745 by Riki Kirk RN  Outcome: Progressing  Flowsheets (Taken 12/23/2022 2000 by Marie Goodrich RN)  Discharge to home or other facility with appropriate resources: Identify barriers to discharge with patient and caregiver  12/23/2022 2203 by Marie Goodrich RN  Outcome: Progressing  [address] (Taken 12/23/2022 2000)  Discharge to home or other facility with appropriate resources: Identify barriers to discharge with patient and caregiver     Problem: Pain  Goal: Verbalizes/displays adequate comfort level or baseline comfort level  12/24/2022 0745 by Riki Kirk RN  Outcome: Progressing  12/23/2022 2203 by Marie Goodrich RN  Outcome: Progressing  Flowsheets (Taken 12/23/2022 2000)  Verbalizes/displays adequate comfort level or baseline comfort level: Encourage patient to monitor pain and request assistance     Problem: ABCDS Injury Assessment  Goal: Absence of physical injury  12/24/2022 0745 by Riki Kirk RN  Outcome: Progressing  [address] (Taken 12/23/2022 2000 by Marie Goodrich RN)  Absence of Physical Injury: Implement safety measures based on patient assessment  12/23/2022 2203 by Marie Goodrich RN  Outcome: Progressing  Flowsheets (Taken 12/23/2022 2000)  Absence of Physical Injury: Implement safety measures based on patient assessment     Problem: Safety - Adult  Goal: Free from fall injury  12/24/2022 0745 by Mona Diaz RN  Outcome: Progressing  12/23/2022 2203 by Cathy Jones RN  Outcome: Progressing  Flowsheets (Taken 12/23/2022 2000)  Free From Fall Injury: Instruct family/caregiver on patient safety

## 2022-12-24 NOTE — PROGRESS NOTES
Hospitalist Progress Note      Synopsis: Briefly, patient admitted on 2022 for Septic shock, with a history of coronary artery disease, Alzheimer's dementia, seizure disorder, hypertension, prostate cancer  who presented to [facility]) on 2022 with hypotension and failure to thrive. UA concerning for UTI. CT of the chest showed extensive lymphadenopathy of the supraclavicular region, mediastinum and hilum concerning for malignancy and widespread bone disease, also infiltrates in the bilateral lower lobes and left upper lobe concerning for pneumonia. CT of the abdomen showed widespread extensive lymphadenopathy in the abdomen and pelvis concerning for widespread malignancy and also showed the diffuse bone metastatic disease. He was also found to have an acute kidney injury. He was admitted to the ICU on vasopressors. Family made decision to make patient comfort care and hospice was consulted. Hospital day 2     Subjective:  Patient seen and examined at bedside with family present, support offered no additional questions at this time. Patient seen and examined  Records reviewed. Temp (24hrs), Av.1 °F (36.2 °C), Min:96.4 °F (35.8 °C), Max:98.4 °F (36.9 °C)    DIET: Diet NPO  CODE: Limited    Intake/Output Summary (Last 24 hours) at 2022 0932  Last data filed at 2022 0900  Gross per 24 hour   Intake 8594.21 ml   Output 425 ml   Net 8169.21 ml       Review of Systems: All bolded are positive; please see HPI  Review of systems deferred as patient not forthcoming with information instability. Objective:    /65   Pulse (!) 109   Temp 97 °F (36.1 °C) (Bladder)   Resp 14   Ht 6' (1.829 m)   Wt 125 lb (56.7 kg)   SpO2 99%   BMI 16.95 kg/m²     General appearance: In moderate distress. HEENT: Conjunctivae/corneas clear. Mucous membranes moist.  Neck: Supple. No JVD. Respiratory: Coarse breath sounds to auscultation bilaterally. Normal respiratory effort. Cardiovascular:  RRR. S1, S2 without MRG. PV: Pulses palpable. No edema. Abdomen: Soft, non-tender, non-distended. +BS  Musculoskeletal: No obvious deformities. Skin: Mottled  skin color. No rashes or lesions. Poor turgor. Neurologic:  Grossly non-focal. Awake, alert, not able to follow commands. Medications:  REVIEWED DAILY    Infusion Medications    vasopressin (Septic Shock) infusion Stopped (12/23/22 0805)    sodium chloride      norepinephrine 53 mcg/min (12/24/22 0907)    sodium chloride 150 mL/hr at 12/24/22 0600     Scheduled Medications    vancomycin  1,000 mg IntraVENous Once    hydrocortisone sodium succinate PF  50 mg IntraVENous Q8H    cefepime  1,000 mg IntraVENous Q12H    pantoprazole (PROTONIX) 40 mg injection  40 mg IntraVENous Daily    heparin (porcine)  5,000 Units SubCUTAneous Q8H    sodium chloride flush  5-40 mL IntraVENous 2 times per day    ipratropium-albuterol  1 ampule Inhalation Q4H WA    vancomycin (VANCOCIN) intermittent dosing (placeholder)   Other RX Placeholder     PRN Meds: sodium chloride flush, sodium chloride, ondansetron **OR** ondansetron, polyethylene glycol, acetaminophen **OR** acetaminophen, morphine    Labs:     Recent Labs     12/22/22  1512 12/23/22  0921 12/24/22  0433   WBC 4.5 8.0 8.4   HGB 8.3* 8.0* 7.8*   HCT 26.1* 26.2* 25.1*   * 111* 123*       Recent Labs     12/22/22  1512 12/22/22  1841 12/23/22  0657 12/23/22  0921 12/24/22  0433   *  --   --  130* 131*   K 5.9*   < > 4.98 5.3* 5.7*   CL 97*  --   --  100 101   CO2 17*  --   --  11* 12*   BUN 77*  --   --  73* 67*   CREATININE 1.7*  --   --  1.7* 1.7*   CALCIUM 9.8  --   --  9.5 9.1   PHOS  --   --   --  4.5 4.8*    < > = values in this interval not displayed.        Recent Labs     12/22/22  1512 12/23/22  0921 12/24/22  0433   PROT 5.7* 5.5* 5.2*   ALKPHOS 630* 610* 649*   ALT 6 8 25   AST 13 19 63*   BILITOT 0.5 0.4 0.4       Recent Labs     12/24/22 0433   INR 1.7       No results for input(s): Lauren Durham in the last 72 hours. 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 Acute toxic metabolic encephalopathy  Atrial fibrillation  Anemia of chronic disease  Plan:  CODE STATUS changed to CC  Comfort measures      DVT Prophylaxis [] Lovenox  [x]  Heparin [] DOAC [] PCDs [] Ambulation    GI Prophylaxis [x] PPI  [] H2 Blocker   [] Carafate  [] Diet/Tube Feeds   Level of care [] Med/Surg  [] Intermediate  [x]  ICU   Diet Diet NPO    Family contact [x]  N/A    [] At bedside  [] Phone call   Disposition Patient requires continued admission currently comfort care with comfort meds being provided in intensive care setting, can discharge to hospice house or home with hospice. MDM [x] Low    [] Moderate  []   High       Discharge Plan: Likely to home with hospice or hospice house    +++++++++++++++++++++++++++++++++++++++++++++++++  Fred[address]  +++++++++++++++++++++++++++++++++++++++++++++++++  NOTE: This report was transcribed using voice recognition software. Every effort was made to ensure accuracy; however, inadvertent computerized transcription errors may be present.

## 2022-12-24 NOTE — PROGRESS NOTES
Pharmacy Consultation Note  (Antibiotic Dosing and Monitoring)    ADDENDUM:    Initial consult date: 12/23/22  Consulting physician/provider: Dr. Aj Herman  Drug: Vancomycin  Indication: Empiric    Vancomycin has been discontinued   Clinical Pharmacy to sign-off  Physician to re-consult pharmacy if future dosing is needed    Thank you for the consult,    Joann Modi PharmD  PGY1 Pharmacy Resident 12/24/2022 11:10 AM [phone]     -------------------------------------------------------------    Initial consult date: 12/23/22  Consulting physician/provider: Dr. Aj Herman  Drug: Vancomycin  Indication: Empiric    Age/  Gender Height Weight IBW  Allergy Information   88 y.o./male 6' (182.9 cm) 125 lb (56.7 kg)     Ideal body weight: 77.6 kg (171 lb 1.2 oz)   Patient has no known allergies. Renal Function:  Recent Labs     12/22/22  1512 12/23/22  0921 12/24/22  0433   BUN 77* 73* 67*   CREATININE 1.7* 1.7* 1.7*       Intake/Output Summary (Last 24 hours) at 12/24/2022 0904  Last data filed at 12/24/2022 0600  Gross per 24 hour   Intake 8594.21 ml   Output 390 ml   Net 8204.21 ml       Vancomycin Monitoring:  Trough:  No results for input(s): VANCOTROUGH in the last 72 hours. Random:    Recent Labs     12/24/22  0433   VANCORANDOM 7.1       Recent Labs     12/22/22  1522   BLOODCULT2 24 Hours no growth        Historical Cultures:  Organism   Date Value Ref Range Status   12/22/2022 Enterobacter cloacae complex (A)  Final     Recent Labs     12/22/22  1512   BC 24 Hours no growth       Vancomycin Administration Times:  Recent vancomycin administrations                     vancomycin (VANCOCIN) 1,000 mg in dextrose 5 % 250 mL IVPB ([id number]) (mg) 1,000 mg New Bag 12/23/22 2492                    Assessment:  Patient is a 80 y.o. male who has been initiated on vancomycin  Estimated Creatinine Clearance: 24 mL/min (A) (based on SCr of 1.7 mg/dL (H)).   12/24: Remains in [facility] with sCr up to 1.7 from baseline around 1.0    Plan:  Vancomycin 1000 mg IV x 1 dose today, may be able to schedule regimen tomorrow pending level results and sCr  Random vancomycin level tomorrow with AM labs  Will continue to monitor renal function   Pharmacy to follow    Denilson VargasD, [facility] Hospital[facility] 12/24/2022 9:05 AM  Pharmacy Clinical Specialist, Emergency Medicine  [phone]

## 2022-12-24 NOTE — PROGRESS NOTES
HOSPICE [facility]    Referral received. Chart reviewed. Per nursing note family does not want to move patient and would like hospice in the hospital. Called nurse as hospice care is not offered in the hospital at this time and to verify family would still like to speak with hospice. Nurse was unavailable. Left message with another nurse who will have her call this nurse when she is available. Electronically signed by Cielo Serra RN on 12/24/2022 at 1:48 PM   [phone] with bedside nurse and made visit to patient's bedside. Met with 3 sons and some other family members. Discussed hospice care and philosophy. Family stated physician said hospice could be done in the hospital. Explained that is not the case. They can continue comfort measures but would not be hospice and cannot stay in the hospital long on comfort measures. Family understanding. Discussed hospice services at hospice house vs home. They would like some time to think about it and give Zayda De La Cruz through the holiday to see if he passes as they do not think he is stable enough to transport. Zayda De La Cruz is having periods of apnea and is cold and mottled. Labored breathing with accessory muscles - asked nurse to medicate. Emotional support provided. Updated nursing.      Electronically signed by Cielo Serra RN on 12/24/2022 at 3:16 PM

## 2022-12-24 NOTE — PROGRESS NOTES
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 exposure control, iterative reconstruction, and/or weight based adjustment of the mA/kV was utilized to reduce the radiation dose to as low as reasonably achievable.; CT of the abdomen and pelvis was performed with the administration of intravenous contrast. Multiplanar reformatted images are provided for review. Automated exposure control, iterative reconstruction, and/or weight based adjustment of the mA/kV was utilized to reduce the radiation dose to as low as reasonably achievable.; CT of the head was performed without the administration of intravenous contrast. Automated exposure control, iterative reconstruction, and/or weight based adjustment of the mA/kV was utilized to reduce the radiation dose to as low as reasonably achievable. COMPARISON: None.  HISTORY: ORDERING SYSTEM PROVIDED HISTORY: further evaluation of bilateral pneumonia and possible bony metastatic lesions TECHNOLOGIST PROVIDED HISTORY: Reason for exam:->further evaluation of bilateral pneumonia and possible bony metastatic lesions Decision Support Exception - unselect if not a suspected or confirmed emergency medical condition->Emergency Medical Condition (MA) What reading provider will be dictating this exam?->CRC FINDINGS: CT head. Comparison September 8, 2022. Findings There is diffuse atrophy with prominence and the sulci. Confluent areas of decreased attenuation are present in the periventricular white matter and brainstem consistent with microvascular/ischemic changes. 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 There is no acute stroke, mass or hemorrhage. The paranasal sinuses are clear. Punctate sclerotic and lytic lesions are identified in the skull base and the upper cervical spine concerning for bone metastasis. Stable atrophy with small vessel ischemic disease and old lacunar CVA in the left thalamus. 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 CT chest. Comparison 02/17/2018. Findings There is cardiomegaly with coronary artery calcification. There is mild ectasia of the ascending thoracic aorta measuring 3.9 x 3.4 cm. There is extensive mediastinal lymphadenopathy with lymph nodes measuring up to 2.9 x 3.3 cm in the paratracheal region and similar ones in the subcarinal region and hilum. Extensive lymph nodes are identified in the supraclavicular region and the thoracic inlet with lymph nodes measuring up to 1.6 cm. Trachea and major bronchi are patent. Large pleural effusion with atelectasis/infiltrates are identified in the lower lobes bilaterally. Focal masslike infiltrative density in the left upper lobe is noted. There is emphysema.   There are innumerable 2-3 mm pulmonary nodules throughout the lungs with tree-in-bud appearance as before likely infectious inflammatory in etiology like TB or fungal infection or atypical pneumonia. There is diffuse mixed sclerotic and lytic lesions throughout the bony thorax which involves the lower cervical and thoracic spine, the sternum, the ribs, and shoulders consistent with the diffuse predominately osteoblastic bone metastasis. CT abdomen and pelvis. Comparison 04/14/2019. Findings Subcentimeter hypodense hepatic lesions are identified, a nonspecific finding. Gallbladder is partially distended. Spleen, pancreas, the adrenals and the kidneys are normal. Extensive lymphadenopathy is identified in the upper abdomen, GE junction, portacaval region and retroperitoneum with lymph nodes measuring up to 4 x 2 cm in the para-aortic region and smaller 1 measuring up to 1.6 cm in the aortocaval region and smaller ones in the retrocaval and retrocrural region. The larger lymph nodes are identified at the aortic bifurcation measuring up to 2 cm with lymph nodes along the iliac chain more on the left side with large conglomeration measuring 5 x 3 cm in the left external iliac chain. Pelvis. The bladder is contracted with the diffuse wall thickening and a Spencer catheter. Please correlate with urinalysis. Prostate gland is heterogeneous and enlarged measuring 4.8 x 5.2 cm. Please correlate with the PSA values. There is distended rectum with the constipation and scattered diverticulosis of the colon. There is diffuse predominantly sclerotic bony lesions in the lower thoracic and lumbar spine and bony pelvis consistent with diffuse bone metastasis. Impression Cardiomegaly with coronary artery calcification with large pleural effusion with atelectasis/infiltrates in the lower lobes bilaterally and masslike infiltrate in the left upper lobe likely CHF/edema and or pneumonia. Extensive lymphadenopathy in the neck, supraclavicular region, mediastinum and hilum concerning for malignancy probably metastasis with widespread bone metastasis.  Extensive pulmonary nodularity as noted likely infectious inflammatory in nature. Extensive lymphadenopathy in the abdomen/ pelvis as noted concerning for widespread malignancy like metastasis or lymphoma. Diffuse bone metastasis as detailed above Constipation and thickened urinary bladder. Please correlate with the urinalysis. Enlarged prostate gland. See above     CT ABDOMEN PELVIS W IV CONTRAST Additional Contrast? None    Result Date: 12/22/2022  EXAMINATION: CT OF THE CHEST WITH CONTRAST; CT OF THE ABDOMEN AND PELVIS WITH CONTRAST; CT OF THE HEAD WITHOUT CONTRAST 12/22/2022 5:55 pm TECHNIQUE: CT of the chest was performed with the administration of intravenous contrast. Multiplanar reformatted images are provided for review. Automated exposure control, iterative reconstruction, and/or weight based adjustment of the mA/kV was utilized to reduce the radiation dose to as low as reasonably achievable.; CT of the abdomen and pelvis was performed with the administration of intravenous contrast. Multiplanar reformatted images are provided for review. Automated exposure control, iterative reconstruction, and/or weight based adjustment of the mA/kV was utilized to reduce the radiation dose to as low as reasonably achievable.; CT of the head was performed without the administration of intravenous contrast. Automated exposure control, iterative reconstruction, and/or weight based adjustment of the mA/kV was utilized to reduce the radiation dose to as low as reasonably achievable. COMPARISON: None. HISTORY: ORDERING SYSTEM PROVIDED HISTORY: further evaluation of bilateral pneumonia and possible bony metastatic lesions TECHNOLOGIST PROVIDED HISTORY: Reason for exam:->further evaluation of bilateral pneumonia and possible bony metastatic lesions Decision Support Exception - unselect if not a suspected or confirmed emergency medical condition->Emergency Medical Condition (MA) What reading provider will be dictating this exam?->CRC FINDINGS: CT head. Comparison September 8, 2022.  Findings There is diffuse atrophy with prominence and the sulci. Confluent areas of decreased attenuation are present in the periventricular white matter and brainstem consistent with microvascular/ischemic changes. 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 There is no acute stroke, mass or hemorrhage. The paranasal sinuses are clear. Punctate sclerotic and lytic lesions are identified in the skull base and the upper cervical spine concerning for bone metastasis. Stable atrophy with small vessel ischemic disease and old lacunar CVA in the left thalamus. 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 CT chest. Comparison 02/17/2018. Findings There is cardiomegaly with coronary artery calcification. There is mild ectasia of the ascending thoracic aorta measuring 3.9 x 3.4 cm. There is extensive mediastinal lymphadenopathy with lymph nodes measuring up to 2.9 x 3.3 cm in the paratracheal region and similar ones in the subcarinal region and hilum. Extensive lymph nodes are identified in the supraclavicular region and the thoracic inlet with lymph nodes measuring up to 1.6 cm. Trachea and major bronchi are patent. Large pleural effusion with atelectasis/infiltrates are identified in the lower lobes bilaterally. Focal masslike infiltrative density in the left upper lobe is noted. There is emphysema. There are innumerable 2-3 mm pulmonary nodules throughout the lungs with tree-in-bud appearance as before likely infectious inflammatory in etiology like TB or fungal infection or atypical pneumonia. There is diffuse mixed sclerotic and lytic lesions throughout the bony thorax which involves the lower cervical and thoracic spine, the sternum, the ribs, and shoulders consistent with the diffuse predominately osteoblastic bone metastasis. CT abdomen and pelvis. Comparison 04/14/2019.  Findings Subcentimeter hypodense hepatic lesions are identified, a nonspecific finding. Gallbladder is partially distended. Spleen, pancreas, the adrenals and the kidneys are normal. Extensive lymphadenopathy is identified in the upper abdomen, GE junction, portacaval region and retroperitoneum with lymph nodes measuring up to 4 x 2 cm in the para-aortic region and smaller 1 measuring up to 1.6 cm in the aortocaval region and smaller ones in the retrocaval and retrocrural region. The larger lymph nodes are identified at the aortic bifurcation measuring up to 2 cm with lymph nodes along the iliac chain more on the left side with large conglomeration measuring 5 x 3 cm in the left external iliac chain. Pelvis. The bladder is contracted with the diffuse wall thickening and a Spencer catheter. Please correlate with urinalysis. Prostate gland is heterogeneous and enlarged measuring 4.8 x 5.2 cm. Please correlate with the PSA values. There is distended rectum with the constipation and scattered diverticulosis of the colon. There is diffuse predominantly sclerotic bony lesions in the lower thoracic and lumbar spine and bony pelvis consistent with diffuse bone metastasis. Impression Cardiomegaly with coronary artery calcification with large pleural effusion with atelectasis/infiltrates in the lower lobes bilaterally and masslike infiltrate in the left upper lobe likely CHF/edema and or pneumonia. Extensive lymphadenopathy in the neck, supraclavicular region, mediastinum and hilum concerning for malignancy probably metastasis with widespread bone metastasis. Extensive pulmonary nodularity as noted likely infectious inflammatory in nature. Extensive lymphadenopathy in the abdomen/ pelvis as noted concerning for widespread malignancy like metastasis or lymphoma. Diffuse bone metastasis as detailed above Constipation and thickened urinary bladder. Please correlate with the urinalysis. Enlarged prostate gland.   See above     XR CHEST PORTABLE    Result Date: 12/24/2022  EXAMINATION: ONE XRAY VIEW OF THE CHEST 12/24/2022 8:14 am COMPARISON: Previous chest x-ray of 12/23/2022 and previous CT of the chest of 12/22/2022. HISTORY: ORDERING SYSTEM PROVIDED HISTORY: PNA TECHNOLOGIST PROVIDED HISTORY: Reason for exam:->PNA What reading provider will be dictating this exam?->CRC FINDINGS: There is now near complete opacification of the left hemithorax which has progressed when compared to prior CT scan of 12/22/2022 and unchanged when compared with the prior chest x-ray of 1 day earlier. Worsening right lung pneumonia is noted. There is no right pneumothorax. 1. Worsening pneumonia seen throughout the right lung 2. Near complete opacification of the left hemithorax representing pneumonia and pleural effusion 3. There is no pneumothorax. XR CHEST PORTABLE    Result Date: 12/23/2022  EXAMINATION: ONE XRAY VIEW OF THE CHEST 12/23/2022 7:34 am COMPARISON: 12/22/2022 HISTORY: ORDERING SYSTEM PROVIDED HISTORY: follow up pna TECHNOLOGIST PROVIDED HISTORY: Reason for exam:->follow up pna What reading provider will be dictating this exam?->CRC FINDINGS: Portable chest reveals near complete opacification seen within the left hemithorax. Patient is status post median sternotomy with broken sternotomy wires of the superior wires. There is worsening identified of the markings throughout the right lung. Degenerative changes seen within the spine. Significant worsening of the markings within the left lung with near complete opacification of the left hemithorax. There is worsening of the markings throughout the periphery of the right lung in the interval.  Small right pleural effusion present. Degenerative changes again seen within the spine.      XR CHEST PORTABLE    Result Date: 12/22/2022  EXAMINATION: ONE XRAY VIEW OF THE CHEST 12/22/2022 3:22 pm COMPARISON: September 8, 2022 HISTORY: ORDERING SYSTEM PROVIDED HISTORY: assess for pneumonia TECHNOLOGIST PROVIDED HISTORY: Reason for exam:->assess for pneumonia What reading provider will be dictating this exam?->CRC FINDINGS: There are patchy infiltrates in the left mid lung and right lateral lung base. The heart is enlarged. There is no pneumothorax. Mild blunting of the left costophrenic angle. Question blastic lesions in the visualized bones. Left mid lung and right lateral lung base pneumonia. Question blastic lesions of the bony structures. Please correlate with clinical history. All Others since admission  CT HEAD WO CONTRAST    Result Date: 12/22/2022  Stable atrophy with small vessel ischemic disease and old lacunar CVA in the left thalamus. 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 CT chest. Comparison 02/17/2018. Findings There is cardiomegaly with coronary artery calcification. There is mild ectasia of the ascending thoracic aorta measuring 3.9 x 3.4 cm. There is extensive mediastinal lymphadenopathy with lymph nodes measuring up to 2.9 x 3.3 cm in the paratracheal region and similar ones in the subcarinal region and hilum. Extensive lymph nodes are identified in the supraclavicular region and the thoracic inlet with lymph nodes measuring up to 1.6 cm. Trachea and major bronchi are patent. Large pleural effusion with atelectasis/infiltrates are identified in the lower lobes bilaterally. Focal masslike infiltrative density in the left upper lobe is noted. There is emphysema. There are innumerable 2-3 mm pulmonary nodules throughout the lungs with tree-in-bud appearance as before likely infectious inflammatory in etiology like TB or fungal infection or atypical pneumonia. There is diffuse mixed sclerotic and lytic lesions throughout the bony thorax which involves the lower cervical and thoracic spine, the sternum, the ribs, and shoulders consistent with the diffuse predominately osteoblastic bone metastasis. CT abdomen and pelvis. Comparison 04/14/2019.  Findings Subcentimeter hypodense hepatic lesions are identified, a nonspecific finding. Gallbladder is partially distended. Spleen, pancreas, the adrenals and the kidneys are normal. Extensive lymphadenopathy is identified in the upper abdomen, GE junction, portacaval region and retroperitoneum with lymph nodes measuring up to 4 x 2 cm in the para-aortic region and smaller 1 measuring up to 1.6 cm in the aortocaval region and smaller ones in the retrocaval and retrocrural region. The larger lymph nodes are identified at the aortic bifurcation measuring up to 2 cm with lymph nodes along the iliac chain more on the left side with large conglomeration measuring 5 x 3 cm in the left external iliac chain. Pelvis. The bladder is contracted with the diffuse wall thickening and a Spencer catheter. Please correlate with urinalysis. Prostate gland is heterogeneous and enlarged measuring 4.8 x 5.2 cm. Please correlate with the PSA values. There is distended rectum with the constipation and scattered diverticulosis of the colon. There is diffuse predominantly sclerotic bony lesions in the lower thoracic and lumbar spine and bony pelvis consistent with diffuse bone metastasis. Impression Cardiomegaly with coronary artery calcification with large pleural effusion with atelectasis/infiltrates in the lower lobes bilaterally and masslike infiltrate in the left upper lobe likely CHF/edema and or pneumonia. Extensive lymphadenopathy in the neck, supraclavicular region, mediastinum and hilum concerning for malignancy probably metastasis with widespread bone metastasis. Extensive pulmonary nodularity as noted likely infectious inflammatory in nature. Extensive lymphadenopathy in the abdomen/ pelvis as noted concerning for widespread malignancy like metastasis or lymphoma. Diffuse bone metastasis as detailed above Constipation and thickened urinary bladder. Please correlate with the urinalysis. Enlarged prostate gland.   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 CT chest. Comparison 02/17/2018. Findings There is cardiomegaly with coronary artery calcification. There is mild ectasia of the ascending thoracic aorta measuring 3.9 x 3.4 cm. There is extensive mediastinal lymphadenopathy with lymph nodes measuring up to 2.9 x 3.3 cm in the paratracheal region and similar ones in the subcarinal region and hilum. Extensive lymph nodes are identified in the supraclavicular region and the thoracic inlet with lymph nodes measuring up to 1.6 cm. Trachea and major bronchi are patent. Large pleural effusion with atelectasis/infiltrates are identified in the lower lobes bilaterally. Focal masslike infiltrative density in the left upper lobe is noted. There is emphysema. There are innumerable 2-3 mm pulmonary nodules throughout the lungs with tree-in-bud appearance as before likely infectious inflammatory in etiology like TB or fungal infection or atypical pneumonia. There is diffuse mixed sclerotic and lytic lesions throughout the bony thorax which involves the lower cervical and thoracic spine, the sternum, the ribs, and shoulders consistent with the diffuse predominately osteoblastic bone metastasis. CT abdomen and pelvis. Comparison 04/14/2019. Findings Subcentimeter hypodense hepatic lesions are identified, a nonspecific finding. Gallbladder is partially distended. Spleen, pancreas, the adrenals and the kidneys are normal. Extensive lymphadenopathy is identified in the upper abdomen, GE junction, portacaval region and retroperitoneum with lymph nodes measuring up to 4 x 2 cm in the para-aortic region and smaller 1 measuring up to 1.6 cm in the aortocaval region and smaller ones in the retrocaval and retrocrural region.  The larger lymph nodes are identified at the aortic bifurcation measuring up to 2 cm with lymph nodes along the iliac chain more on the left side with large conglomeration measuring 5 x 3 cm in the left external iliac chain. Pelvis. The bladder is contracted with the diffuse wall thickening and a Spencer catheter. Please correlate with urinalysis. Prostate gland is heterogeneous and enlarged measuring 4.8 x 5.2 cm. Please correlate with the PSA values. There is distended rectum with the constipation and scattered diverticulosis of the colon. There is diffuse predominantly sclerotic bony lesions in the lower thoracic and lumbar spine and bony pelvis consistent with diffuse bone metastasis. Impression Cardiomegaly with coronary artery calcification with large pleural effusion with atelectasis/infiltrates in the lower lobes bilaterally and masslike infiltrate in the left upper lobe likely CHF/edema and or pneumonia. Extensive lymphadenopathy in the neck, supraclavicular region, mediastinum and hilum concerning for malignancy probably metastasis with widespread bone metastasis. Extensive pulmonary nodularity as noted likely infectious inflammatory in nature. Extensive lymphadenopathy in the abdomen/ pelvis as noted concerning for widespread malignancy like metastasis or lymphoma. Diffuse bone metastasis as detailed above Constipation and thickened urinary bladder. Please correlate with the urinalysis. Enlarged prostate gland. See above     CT ABDOMEN PELVIS W IV CONTRAST Additional Contrast? 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 CT chest. Comparison 02/17/2018. Findings There is cardiomegaly with coronary artery calcification. There is mild ectasia of the ascending thoracic aorta measuring 3.9 x 3.4 cm.   There is extensive mediastinal lymphadenopathy with lymph nodes measuring up to 2.9 x 3.3 cm in the paratracheal region and similar ones in the subcarinal region and hilum. Extensive lymph nodes are identified in the supraclavicular region and the thoracic inlet with lymph nodes measuring up to 1.6 cm. Trachea and major bronchi are patent. Large pleural effusion with atelectasis/infiltrates are identified in the lower lobes bilaterally. Focal masslike infiltrative density in the left upper lobe is noted. There is emphysema. There are innumerable 2-3 mm pulmonary nodules throughout the lungs with tree-in-bud appearance as before likely infectious inflammatory in etiology like TB or fungal infection or atypical pneumonia. There is diffuse mixed sclerotic and lytic lesions throughout the bony thorax which involves the lower cervical and thoracic spine, the sternum, the ribs, and shoulders consistent with the diffuse predominately osteoblastic bone metastasis. CT abdomen and pelvis. Comparison 04/14/2019. Findings Subcentimeter hypodense hepatic lesions are identified, a nonspecific finding. Gallbladder is partially distended. Spleen, pancreas, the adrenals and the kidneys are normal. Extensive lymphadenopathy is identified in the upper abdomen, GE junction, portacaval region and retroperitoneum with lymph nodes measuring up to 4 x 2 cm in the para-aortic region and smaller 1 measuring up to 1.6 cm in the aortocaval region and smaller ones in the retrocaval and retrocrural region. The larger lymph nodes are identified at the aortic bifurcation measuring up to 2 cm with lymph nodes along the iliac chain more on the left side with large conglomeration measuring 5 x 3 cm in the left external iliac chain. Pelvis. The bladder is contracted with the diffuse wall thickening and a Spencer catheter. Please correlate with urinalysis. Prostate gland is heterogeneous and enlarged measuring 4.8 x 5.2 cm. Please correlate with the PSA values. There is distended rectum with the constipation and scattered diverticulosis of the colon.  There is diffuse predominantly sclerotic bony lesions in the lower thoracic and lumbar spine and bony pelvis consistent with diffuse bone metastasis. Impression Cardiomegaly with coronary artery calcification with large pleural effusion with atelectasis/infiltrates in the lower lobes bilaterally and masslike infiltrate in the left upper lobe likely CHF/edema and or pneumonia. Extensive lymphadenopathy in the neck, supraclavicular region, mediastinum and hilum concerning for malignancy probably metastasis with widespread bone metastasis. Extensive pulmonary nodularity as noted likely infectious inflammatory in nature. Extensive lymphadenopathy in the abdomen/ pelvis as noted concerning for widespread malignancy like metastasis or lymphoma. Diffuse bone metastasis as detailed above Constipation and thickened urinary bladder. Please correlate with the urinalysis. Enlarged prostate gland. See above     XR CHEST PORTABLE    Result Date: 12/24/2022  1. Worsening pneumonia seen throughout the right lung 2. Near complete opacification of the left hemithorax representing pneumonia and pleural effusion 3. There is no pneumothorax. XR CHEST PORTABLE    Result Date: 12/23/2022  Significant worsening of the markings within the left lung with near complete opacification of the left hemithorax. There is worsening of the markings throughout the periphery of the right lung in the interval.  Small right pleural effusion present. Degenerative changes again seen within the spine. XR CHEST PORTABLE    Result Date: 12/22/2022  Left mid lung and right lateral lung base pneumonia. Question blastic lesions of the bony structures. Please correlate with clinical history.                             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 fib with NVR  Anemia of chronic disease     Plan:     Neuro: Patient is following commands but confused, no focal neurodeficit  Pulmonary: Bilateral pneumonia with bilateral pulmonary nodules suggestive of metastasis. Left-sided pleural effusion greater than the right. Left upper lobe masslike infiltrative lesion. Cefepime and vancomycin ordered because of immunocompromise state. Comfortably 2 L nasal cannula. Cardiovascular: Continues to be on Levophed drip. Add stress dose steroids for septic shock. Echo ordered for evaluation of EF. Abdomen/GI: Diffuse lymphadenopathy in the abdomen suggestive of lymphoma. Continue diet. Renal: Hyperkalemia and SHMUEL noted with lactic acidosis. SHMUEL is likely prerenal.  Continue normal saline at 150 mill per hour. MSK: No active issues   Skin: No active issues   Hematology: Hypochromic normocytic anemia. Likely anemia of chronic disease. Endocrine: Monitor BS  ID: Cefepime and vancomycin to continue. DVT/GI: Prophylaxis: Heparin and Protonix  Code Status: DNR-CCA. GOC Discussion: Family is deciding for comfort care. Possibility of comfort care today. Disposition: ICU  Total Critical care time spent  35 mins. This did not include any procedures. During multidisciplinary team rounds Mechelle Contreras, was seen, examined and discussed. This is confirmation that I have personally seen and examined the patient and that the key elements of the encounter were performed by me (> 85 % time). The medications & laboratory data and imagery was discussed and adjusted where necessary. Key issues of the case were discussed among consultants. This patient has a high probability of sudden clinically significant deterioration. I managed/supervised life or organ supporting interventions that required frequent physician assessment. I devoted my full attention to the direct care of this patient for the period of time indicated below. In addition to above, time was devoted to teaching and to any procedure. NOTE: This report, in part or full, may have been transcribed using voice recognition software. Every effort was made to ensure accuracy; however, inadvertent computerized transcription errors may be present. Please excuse any transcriptional grammatical or spelling errors that may have escaped my editorial review. Total critical care time caring for this patient with life threatening, unstable organ failure, including direct patient contact, management of life support systems, review of data including imaging and labs, discussions with other team members and physicians is at least 28 Min so far today, excluding procedures.       Electronically signed by Jeanmarie Talamantes MD on 12/24/2022 at 9:28 AM  [address]

## 2022-12-25 NOTE — PROGRESS NOTES
Hospital Medicine Discharge Summary    Patient ID: Jairo De La Torre      Patient's PCP: Que Peoples DO    Admitting Physician: Yashira Cotton MD  Discharge Physician: AMERICO Arnold - CNP     Admit Date: 2022     Disposition:     Discharge Diagnoses:   Principal Problem:    Septic shock [facility])  Resolved Problems:    * No resolved hospital problems. *      Date of Death:2022  Time of Death:1300    Immediate Cause of Death: sepsis  Underlying Conditions:UTI, bacterial pneumonia, lung metastasis, Pleural effusion  Other Contributing Conditions:atrial fibrillation, anemia     Hospital Course: Briefly, patient admitted on 2022 for Septic shock, with a history of coronary artery disease, Alzheimer's dementia, seizure disorder, hypertension, prostate cancer  who presented to [facility]) on 2022 with hypotension and failure to thrive. UA concerning for UTI. CT of the chest showed extensive lymphadenopathy of the supraclavicular region, mediastinum and hilum concerning for malignancy and widespread bone disease, also infiltrates in the bilateral lower lobes and left upper lobe concerning for pneumonia. CT of the abdomen showed widespread extensive lymphadenopathy in the abdomen and pelvis concerning for widespread malignancy and also showed the diffuse bone metastatic disease. He was also found to have an acute kidney injury. He was admitted to the ICU on vasopressors. Family made decision to make patient comfort care and hospice was consulted.   Per nursing staff, TOD is 1300 on 2022      Consults:  IP CONSULT TO CRITICAL CARE  IP CONSULT TO INTERNAL MEDICINE  IP CONSULT TO PALLIATIVE CARE  IP CONSULT TO ONCOLOGY  IP CONSULT TO SOCIAL WORK  IP CONSULT TO HOSPICE    Signed:  AMERICO Arnold  2022

## 2022-12-25 NOTE — PLAN OF CARE
Problem: Skin/Tissue Integrity  Goal: Absence of new skin breakdown  Description: 1. Monitor for areas of redness and/or skin breakdown  2. Assess vascular access sites hourly  3. Every 4-6 hours minimum:  Change oxygen saturation probe site  4. Every 4-6 hours:  If on nasal continuous positive airway pressure, respiratory therapy assess nares and determine need for appliance change or resting period. Outcome: Progressing     Problem: Discharge Planning  Goal: Discharge to home or other facility with appropriate resources  Outcome: Progressing     Problem: Pain  Goal: Verbalizes/displays adequate comfort level or baseline comfort level  Outcome: Progressing     Problem: ABCDS Injury Assessment  Goal: Absence of physical injury  Outcome: Progressing     Problem: Safety - Adult  Goal: Free from fall injury  Outcome: Progressing     Problem: Dyspnea Due to End of Life  Goal: Demonstrate understanding of and ability to manage respiratory symptoms at end of life  Description: Patient  and or family/caregiver will verbalize recall of breathing strategies to maintain an effective breathing pattern during the inpatient hospice stay. 12/24/2022 2216 by Berry Gold RN  Outcome: Progressing  12/24/2022 1147 by Aniya Patel RN  Outcome: Progressing     Problem: Communication Deficit  Goal: Effectively communicate symptoms, needs, and concerns  Description: Patient  and/or family/caregiver will be able to communicate symptoms, needs, and concerns as evidenced by the use of language services during the inpatient hospice stay.     12/24/2022 2216 by Berry Gold RN  Outcome: Progressing  12/24/2022 1147 by Aniya Patel RN  Outcome: Progressing

## 2022-12-26 LAB
EKG ATRIAL RATE: 89 BPM
EKG Q-T INTERVAL: 346 MS
EKG QRS DURATION: 92 MS
EKG QTC CALCULATION (BAZETT): 420 MS
EKG R AXIS: 46 DEGREES
EKG T AXIS: -139 DEGREES
EKG VENTRICULAR RATE: 89 BPM

## 2022-12-27 LAB
BLOOD CULTURE, ROUTINE: NORMAL
CULTURE, BLOOD 2: NORMAL